# Patient Record
Sex: FEMALE | Race: WHITE | NOT HISPANIC OR LATINO | Employment: FULL TIME | URBAN - METROPOLITAN AREA
[De-identification: names, ages, dates, MRNs, and addresses within clinical notes are randomized per-mention and may not be internally consistent; named-entity substitution may affect disease eponyms.]

---

## 2017-06-09 ENCOUNTER — TELEPHONE (OUTPATIENT)
Dept: FAMILY MEDICINE | Facility: CLINIC | Age: 52
End: 2017-06-09

## 2017-06-09 NOTE — TELEPHONE ENCOUNTER
6/9/2017    Call Regarding Preventive Health Screening Colonoscopy and Mammogram    Attempt 1    Message on voicemail     Comments:       Outreach   cnt

## 2019-11-02 ENCOUNTER — HOSPITAL ENCOUNTER (EMERGENCY)
Facility: CLINIC | Age: 54
Discharge: HOME | End: 2019-11-02
Attending: EMERGENCY MEDICINE | Admitting: EMERGENCY MEDICINE
Payer: COMMERCIAL

## 2019-11-02 VITALS
SYSTOLIC BLOOD PRESSURE: 144 MMHG | HEIGHT: 68 IN | BODY MASS INDEX: 34.86 KG/M2 | OXYGEN SATURATION: 98 % | HEART RATE: 86 BPM | TEMPERATURE: 97.5 F | DIASTOLIC BLOOD PRESSURE: 84 MMHG | WEIGHT: 230 LBS

## 2019-11-02 DIAGNOSIS — M94.0 COSTOCHONDRITIS: ICD-10-CM

## 2019-11-02 PROCEDURE — 99283 EMERGENCY DEPT VISIT LOW MDM: CPT | Performed by: EMERGENCY MEDICINE

## 2019-11-02 PROCEDURE — 99282 EMERGENCY DEPT VISIT SF MDM: CPT | Mod: Z6 | Performed by: EMERGENCY MEDICINE

## 2019-11-02 ASSESSMENT — MIFFLIN-ST. JEOR: SCORE: 1686.77

## 2019-11-02 NOTE — ED AVS SNAPSHOT
[facility] Emergency Department  [address] [address]  Phone:  [phone]  Fax:  [phone]                                    Ifeoma Grajeda   MRN: [id number]    Department:  [facility] Emergency Department   Date of Visit:  11/2/2019           After Visit Summary Signature Page    I have received my discharge instructions, and my questions have been answered. I have discussed any challenges I see with this plan with the nurse or doctor.    ..........................................................................................................................................  Patient/Patient Representative Signature      ..........................................................................................................................................  Patient Representative Print Name and Relationship to Patient    ..................................................               ................................................  Date                                   Time    ..........................................................................................................................................  Reviewed by Signature/Title    ...................................................              ..............................................  Date                                               Time          22EPIC Rev 08/18

## 2019-11-02 NOTE — DISCHARGE INSTRUCTIONS
Tylenol/ibuprofen for discomfort, activity as tolerated    Return here for trouble breathing, faintness, fever, worsening pain or any other concern

## 2019-11-02 NOTE — ED PROVIDER NOTES
History     Chief Complaint   Patient presents with     Flank Pain     lt flank pain for past few days, no urinary issues, no fever     HPI  Ifeoma Grajeda is a 54 year old female who present to the ED for evaluation of left sided flank pain. She reports that the pain began a few days ago, is intermittent and feels sharp. She states that her pain is similar to pain she experienced when she had a kidney infection, seen earlier today sent here for possible imaging. She has tried icing, tylenol and ibuprofen, but found no relief. 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 She denies abdominal pain, urinary symptoms, fever and nausea.      Allergies:  No Known Allergies    Problem List:    Patient Active Problem List    Diagnosis Date Noted     Urge incontinence of urine 09/09/2014     Priority: Medium     Abnormal glucose 05/06/2014     Priority: Medium     Problem list name updated by automated process. Provider to review       Hypothyroidism 10/19/2012     Priority: Medium     Hyperlipidemia LDL goal <130 09/27/2012     Priority: Medium        Past Medical History:    Past Medical History:   Diagnosis Date     Hx of hysterectomy      Hypothyroid        Past Surgical History:    Past Surgical History:   Procedure Laterality Date     C REMOVAL GALLBLADDER       HYSTERECTOMY TOTAL ABDOMINAL         Family History:    Family History   Problem Relation Age of Onset     Osteoporosis Mother      Cancer Mother      Cancer Father         Lung     Alzheimer Disease Mother      Thyroid Disease Mother        Social History:  Marital Status:   [2]  Social History     Tobacco Use     Smoking status: Never Smoker     Smokeless tobacco: Never Used   Substance Use Topics     Alcohol use: Yes     Comment: ocassionaly     Drug use: No        Medications:    acetaminophen-codeine (TYLENOL W/CODEINE NO. 3) 300-30 MG per tablet  diclofenac (VOLTAREN) 50 MG EC  "tablet  ibuprofen (ADVIL,MOTRIN) 200 MG tablet  levothyroxine (SYNTHROID, LEVOTHROID) 88 MCG tablet  predniSONE (DELTASONE) 20 MG tablet          Review of Systems  All other systems reviewed and are negative.    Physical Exam   BP: (!) 144/84  Pulse: 86  Temp: 97.5  F (36.4  C)  Height: 172.7 cm (5' 8\")  Weight: 104.3 kg (230 lb)  SpO2: 98 %      Physical Exam  Nontoxic appearing no respiratory distress alert and oriented ×3  Head atraumatic normocephalic  Neck supple full active painless range of motion  Lungs clear to auscultation  Tender costochondral junction ribs 10 and 11 on the left side reproduces pain complaint  Heart regular no murmur  No CVA tenderness  Abdomen soft nontender bowel sounds positive no masses or HSM  Strength and sensation grossly intact throughout the extremities, gait and station normal  Speech is fluent, good eye contact, thought processes are rational  Lower extremities without swelling, redness or tenderness  Pedal pulses symmetrical and strong    ED Course        Procedures               Critical Care time:  none               No results found for this or any previous visit (from the past 24 hour(s)).    Medications - No data to display     9:10 patient assessed. Course of care outlined.     Assessments & Plan (with Medical Decision Making)  Costochondral chest wall pain left posterior lateral inferior.  Palpation reproduces pain complaint.  Usual differential considered, no indication for further evaluation.  Tylenol/ibuprofen.  Return criteria reviewed     I have reviewed the nursing notes.    I have reviewed the findings, diagnosis, plan and need for follow up with the patient.          Discharge Medication List as of 11/2/2019  9:56 AM          Final diagnoses:   Costochondritis     This document serves as a record of services personally performed by Darrell Polanco MD. It was created on their behalf by Mercy Witt, a trained medical scribe. The creation of this record is " based on the provider's personal observations and the statements of the patient. This document has been checked and approved by the attending provider.    11/2/2019   [facility] EMERGENCY DEPARTMENT     Darrell Polanco MD  11/02/19 1549

## 2020-12-22 ENCOUNTER — HOSPITAL ENCOUNTER (EMERGENCY)
Facility: CLINIC | Age: 55
Discharge: HOME | End: 2020-12-22
Attending: NURSE PRACTITIONER | Admitting: NURSE PRACTITIONER
Payer: OTHER MISCELLANEOUS

## 2020-12-22 VITALS
HEIGHT: 68 IN | SYSTOLIC BLOOD PRESSURE: 123 MMHG | TEMPERATURE: 98.8 F | BODY MASS INDEX: 34.25 KG/M2 | RESPIRATION RATE: 18 BRPM | DIASTOLIC BLOOD PRESSURE: 75 MMHG | OXYGEN SATURATION: 91 % | HEART RATE: 79 BPM | WEIGHT: 226 LBS

## 2020-12-22 DIAGNOSIS — Z20.822 SUSPECTED COVID-19 VIRUS INFECTION: ICD-10-CM

## 2020-12-22 DIAGNOSIS — R06.02 SHORTNESS OF BREATH: ICD-10-CM

## 2020-12-22 DIAGNOSIS — R11.2 NAUSEA WITH VOMITING: ICD-10-CM

## 2020-12-22 DIAGNOSIS — U07.1 2019 NOVEL CORONAVIRUS DISEASE (COVID-19): ICD-10-CM

## 2020-12-22 LAB
ANION GAP SERPL CALCULATED.3IONS-SCNC: 4 MMOL/L (ref 3–14)
BASOPHILS # BLD AUTO: 0 10E9/L (ref 0–0.2)
BASOPHILS NFR BLD AUTO: 0.3 %
BUN SERPL-MCNC: 5 MG/DL (ref 7–30)
CALCIUM SERPL-MCNC: 8 MG/DL (ref 8.5–10.1)
CHLORIDE SERPL-SCNC: 105 MMOL/L (ref 94–109)
CO2 SERPL-SCNC: 29 MMOL/L (ref 20–32)
CREAT SERPL-MCNC: 0.55 MG/DL (ref 0.52–1.04)
DIFFERENTIAL METHOD BLD: NORMAL
EOSINOPHIL # BLD AUTO: 0 10E9/L (ref 0–0.7)
EOSINOPHIL NFR BLD AUTO: 0.2 %
ERYTHROCYTE [DISTWIDTH] IN BLOOD BY AUTOMATED COUNT: 13.2 % (ref 10–15)
GFR SERPL CREATININE-BSD FRML MDRD: >90 ML/MIN/{1.73_M2}
GLUCOSE SERPL-MCNC: 113 MG/DL (ref 70–99)
HCT VFR BLD AUTO: 38.8 % (ref 35–47)
HGB BLD-MCNC: 12.8 G/DL (ref 11.7–15.7)
IMM GRANULOCYTES # BLD: 0.1 10E9/L (ref 0–0.4)
IMM GRANULOCYTES NFR BLD: 0.8 %
LYMPHOCYTES # BLD AUTO: 0.8 10E9/L (ref 0.8–5.3)
LYMPHOCYTES NFR BLD AUTO: 13.4 %
MCH RBC QN AUTO: 27.1 PG (ref 26.5–33)
MCHC RBC AUTO-ENTMCNC: 33 G/DL (ref 31.5–36.5)
MCV RBC AUTO: 82 FL (ref 78–100)
MONOCYTES # BLD AUTO: 0.5 10E9/L (ref 0–1.3)
MONOCYTES NFR BLD AUTO: 9 %
NEUTROPHILS # BLD AUTO: 4.6 10E9/L (ref 1.6–8.3)
NEUTROPHILS NFR BLD AUTO: 76.3 %
NRBC # BLD AUTO: 0 10*3/UL
NRBC BLD AUTO-RTO: 0 /100
PLATELET # BLD AUTO: 251 10E9/L (ref 150–450)
POTASSIUM SERPL-SCNC: 3.2 MMOL/L (ref 3.4–5.3)
RBC # BLD AUTO: 4.73 10E12/L (ref 3.8–5.2)
SODIUM SERPL-SCNC: 138 MMOL/L (ref 133–144)
WBC # BLD AUTO: 6 10E9/L (ref 4–11)

## 2020-12-22 PROCEDURE — 258N000003 HC RX IP 258 OP 636: Performed by: NURSE PRACTITIONER

## 2020-12-22 PROCEDURE — 99284 EMERGENCY DEPT VISIT MOD MDM: CPT | Mod: 25 | Performed by: NURSE PRACTITIONER

## 2020-12-22 PROCEDURE — 96374 THER/PROPH/DIAG INJ IV PUSH: CPT | Performed by: NURSE PRACTITIONER

## 2020-12-22 PROCEDURE — 80048 BASIC METABOLIC PNL TOTAL CA: CPT | Performed by: NURSE PRACTITIONER

## 2020-12-22 PROCEDURE — 250N000011 HC RX IP 250 OP 636: Performed by: NURSE PRACTITIONER

## 2020-12-22 PROCEDURE — 85025 COMPLETE CBC W/AUTO DIFF WBC: CPT | Performed by: NURSE PRACTITIONER

## 2020-12-22 PROCEDURE — 99284 EMERGENCY DEPT VISIT MOD MDM: CPT | Performed by: NURSE PRACTITIONER

## 2020-12-22 PROCEDURE — 96361 HYDRATE IV INFUSION ADD-ON: CPT | Performed by: NURSE PRACTITIONER

## 2020-12-22 RX ORDER — ONDANSETRON 4 MG/1
4 TABLET, ORALLY DISINTEGRATING ORAL EVERY 6 HOURS PRN
Qty: 10 TABLET | Refills: 0 | Status: SHIPPED | OUTPATIENT
Start: 2020-12-22 | End: 2024-03-28

## 2020-12-22 RX ORDER — ONDANSETRON 2 MG/ML
4 INJECTION INTRAMUSCULAR; INTRAVENOUS ONCE
Status: COMPLETED | OUTPATIENT
Start: 2020-12-22 | End: 2020-12-22

## 2020-12-22 RX ADMIN — SODIUM CHLORIDE 1000 ML: 9 INJECTION, SOLUTION INTRAVENOUS at 13:39

## 2020-12-22 RX ADMIN — ONDANSETRON 4 MG: 2 INJECTION INTRAMUSCULAR; INTRAVENOUS at 13:39

## 2020-12-22 ASSESSMENT — ENCOUNTER SYMPTOMS
DYSURIA: 0
TROUBLE SWALLOWING: 0
ARTHRALGIAS: 0
RHINORRHEA: 0
SINUS PAIN: 0
SHORTNESS OF BREATH: 1
SINUS PRESSURE: 0
EYE REDNESS: 0
COUGH: 0
LIGHT-HEADEDNESS: 0
WHEEZING: 0
SORE THROAT: 0
ABDOMINAL PAIN: 0
CHEST TIGHTNESS: 0
WEAKNESS: 0
FEVER: 0
NAUSEA: 1
DIARRHEA: 0
NUMBNESS: 0
JOINT SWELLING: 0
BLOOD IN STOOL: 0
STRIDOR: 0
MYALGIAS: 0
VOMITING: 1
EYE DISCHARGE: 0
HEADACHES: 0
DIZZINESS: 0
FATIGUE: 0
CHILLS: 0

## 2020-12-22 ASSESSMENT — MIFFLIN-ST. JEOR: SCORE: 1663.63

## 2020-12-22 NOTE — LETTER
December 22, 2020      To Whom It May Concern:      Ifeoma Grajeda was seen in our Emergency Department today, 12/22/20.  I expect her condition to improve over the next few days.  She may return to work when improved.    Sincerely,        МАРИЯ Head CNP

## 2020-12-22 NOTE — ED NOTES
"Pt reports 10 days post positive covid.  Pt reports continues to feel fatigue with no appetite.   Pt reports vomited after eating today due to things \" tasting\" bad.    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   Pt called work and reported that pt was in ED getting fluids   "

## 2020-12-22 NOTE — ED PROVIDER NOTES
History     Chief Complaint   Patient presents with     Vomiting     diagnosed covid positive 10 days ago; vomiting started am, concerned about dehydration, NO loss of taste of smell, reports unable to keep anything down, some shortness of breath     HPI  Ifeoma Grajeda is a 55 year old female who presents to the emergency department for evaluation of nausea and vomiting today. Patient diagnosed with COVID-19 10 days ago and was to return to work today but developed nausea and vomiting. 3 episodes of emesis, nonbloody nonbilious. She has accompanying lightheadedness and continued shortness of breath and fatigue. Reports lack of appetite and 'doesn't feel thirsty' so has had decreased intake. No fevers, headache, dizziness, chest pain, abdominal pain, diarrhea, and dysuria. No other sick individuals in the home. Nonsmoker without asthma or COPD.     Allergies:  No Known Allergies    Problem List:    Patient Active Problem List    Diagnosis Date Noted     Urge incontinence of urine 09/09/2014     Priority: Medium     Abnormal glucose 05/06/2014     Priority: Medium     Problem list name updated by automated process. Provider to review       Hypothyroidism 10/19/2012     Priority: Medium     Hyperlipidemia LDL goal <130 09/27/2012     Priority: Medium        Past Medical History:    Past Medical History:   Diagnosis Date     Hx of hysterectomy      Hypothyroid        Past Surgical History:    Past Surgical History:   Procedure Laterality Date     C REMOVAL GALLBLADDER       HYSTERECTOMY TOTAL ABDOMINAL       Family History:    Family History   Problem Relation Age of Onset     Osteoporosis Mother      Cancer Mother      Cancer Father         Lung     Alzheimer Disease Mother      Thyroid Disease Mother      Social History:  Marital Status:   [2]  Social History     Tobacco Use     Smoking status: Never Smoker     Smokeless tobacco: Never Used   Substance Use Topics     Alcohol use: Yes     Comment:  "ocassionaly     Drug use: No      Medications:         ondansetron (ZOFRAN ODT) 4 MG ODT tab      Review of Systems   Constitutional: Negative for chills, fatigue and fever.   HENT: Negative for congestion, ear discharge, ear pain, rhinorrhea, sinus pressure, sinus pain, sore throat and trouble swallowing.    Eyes: Negative for discharge and redness.   Respiratory: Positive for shortness of breath. Negative for cough, chest tightness, wheezing and stridor.    Cardiovascular: Negative for chest pain.   Gastrointestinal: Positive for nausea and vomiting. Negative for abdominal pain, blood in stool and diarrhea.   Genitourinary: Negative for dysuria.   Musculoskeletal: Negative for arthralgias, joint swelling and myalgias.   Skin: Negative for rash.   Neurological: Negative for dizziness, weakness, light-headedness, numbness and headaches.   All other systems reviewed and are negative.    Physical Exam   BP: 132/64  Pulse: 93  Temp: 98.8  F (37.1  C)  Resp: 18  Height: 172.7 cm (5' 8\")  Weight: 102.5 kg (226 lb)  SpO2: 94 %    Physical Exam  Constitutional:       General: She is not in acute distress.     Appearance: She is well-developed. She is not diaphoretic.   HENT:      Head: Normocephalic.   Eyes:      Conjunctiva/sclera: Conjunctivae normal.      Pupils: Pupils are equal, round, and reactive to light.   Neck:      Musculoskeletal: Normal range of motion and neck supple.   Cardiovascular:      Rate and Rhythm: Normal rate and regular rhythm.      Pulses: Normal pulses.   Pulmonary:      Effort: Pulmonary effort is normal. No tachypnea, accessory muscle usage or respiratory distress.      Breath sounds: Normal breath sounds and air entry. No decreased air movement. No decreased breath sounds, wheezing or rhonchi.   Abdominal:      General: There is no distension.      Palpations: Abdomen is soft.      Tenderness: There is no abdominal tenderness. There is no right CVA tenderness, left CVA tenderness, guarding or " rebound.   Musculoskeletal: Normal range of motion.   Skin:     General: Skin is warm.      Capillary Refill: Capillary refill takes less than 2 seconds.   Neurological:      General: No focal deficit present.      Mental Status: She is alert and oriented to person, place, and time.   Psychiatric:         Mood and Affect: Mood normal.       ED Course        Procedures    Results for orders placed or performed during the hospital encounter of 12/22/20 (from the past 24 hour(s))   CBC with platelets, differential   Result Value Ref Range    WBC 6.0 4.0 - 11.0 10e9/L    RBC Count 4.73 3.8 - 5.2 10e12/L    Hemoglobin 12.8 11.7 - 15.7 g/dL    Hematocrit 38.8 35.0 - 47.0 %    MCV 82 78 - 100 fl    MCH 27.1 26.5 - 33.0 pg    MCHC 33.0 31.5 - 36.5 g/dL    RDW 13.2 10.0 - 15.0 %    Platelet Count 251 150 - 450 10e9/L    Diff Method Automated Method     % Neutrophils 76.3 %    % Lymphocytes 13.4 %    % Monocytes 9.0 %    % Eosinophils 0.2 %    % Basophils 0.3 %    % Immature Granulocytes 0.8 %    Nucleated RBCs 0 0 /100    Absolute Neutrophil 4.6 1.6 - 8.3 10e9/L    Absolute Lymphocytes 0.8 0.8 - 5.3 10e9/L    Absolute Monocytes 0.5 0.0 - 1.3 10e9/L    Absolute Eosinophils 0.0 0.0 - 0.7 10e9/L    Absolute Basophils 0.0 0.0 - 0.2 10e9/L    Abs Immature Granulocytes 0.1 0 - 0.4 10e9/L    Absolute Nucleated RBC 0.0    Basic metabolic panel   Result Value Ref Range    Sodium 138 133 - 144 mmol/L    Potassium 3.2 (L) 3.4 - 5.3 mmol/L    Chloride 105 94 - 109 mmol/L    Carbon Dioxide 29 20 - 32 mmol/L    Anion Gap 4 3 - 14 mmol/L    Glucose 113 (H) 70 - 99 mg/dL    Urea Nitrogen 5 (L) 7 - 30 mg/dL    Creatinine 0.55 0.52 - 1.04 mg/dL    GFR Estimate >90 >60 mL/min/[1.73_m2]    GFR Estimate If Black >90 >60 mL/min/[1.73_m2]    Calcium 8.0 (L) 8.5 - 10.1 mg/dL     Medications   0.9% sodium chloride BOLUS (0 mLs Intravenous Stopped 12/22/20 8034)   ondansetron (ZOFRAN) injection 4 mg (4 mg Intravenous Given 12/22/20 0782)      Assessments & Plan (with Medical Decision Making)   Ifeoma Grajeda is a 55 year old female who presents to the emergency department for evaluation of nausea and vomiting today. Patient diagnosed with COVID-19 10 days ago and was to return to work today but developed nausea and vomiting. 3 episodes of emesis, nonbloody nonbilious. She has accompanying lightheadedness and continued shortness of breath and fatigue. Reports lack of appetite and 'doesn't feel thirsty' so has had decreased intake.     Patient is well appearing and in no acute distress. Blood pressure, pulse, respiratory rate and temp normal. Oxygen saturations ranging from 88-96%. Patient reports not feeling different based on oxygen saturations. CBC and BMP normal. Symptomatic resolution with fluids and Zofran. Will provide home rx for Zofran. Patient is a great candidate for the home oxygen saturation monitoring program and [facility]. She is agreeable to plan and home monitoring. Work note provided. Return to the ED with new or worsening symptoms. May use over the counter medications as needed and appropriate. Increase rest and hydration. Return precautions reviewed, all questions answered. Patient agreeable to plan of care and discharged in stable condition.    I have reviewed the nursing notes.    I have reviewed the findings, diagnosis, plan and need for follow up with the patient.  Discharge Medication List as of 12/22/2020  3:38 PM      START taking these medications    Details   ondansetron (ZOFRAN ODT) 4 MG ODT tab Take 1 tablet (4 mg) by mouth every 6 hours as needed for nausea, Disp-10 tablet, R-0, E-Prescribe           Final diagnoses:   Suspected COVID-19 virus infection   Shortness of breath   2019 novel coronavirus disease (COVID-19)   Nausea with vomiting     12/22/2020   [facility] EMERGENCY DEPT     Bianca Trivedi, APRN CNP  12/22/20 9760

## 2020-12-22 NOTE — ED TRIAGE NOTES
diagnosed covid positive 10 days ago; vomiting started am, concerned about dehydration, NO loss of taste of smell, reports unable to keep anything down, some shortness of breath

## 2020-12-22 NOTE — ED AVS SNAPSHOT
[facility] Emergency Dept  [address] [address]  Phone: [phone]  Fax: [phone]                                    Ifeoma Grajeda   MRN: [id number]    Department: [facility] Emergency Dept   Date of Visit: 12/22/2020           After Visit Summary Signature Page    I have received my discharge instructions, and my questions have been answered. I have discussed any challenges I see with this plan with the nurse or doctor.    ..........................................................................................................................................  Patient/Patient Representative Signature      ..........................................................................................................................................  Patient Representative Print Name and Relationship to Patient    ..................................................               ................................................  Date                                   Time    ..........................................................................................................................................  Reviewed by Signature/Title    ...................................................              ..............................................  Date                                               Time          22EPIC Rev 08/18

## 2020-12-23 ENCOUNTER — PATIENT OUTREACH (OUTPATIENT)
Dept: CARE COORDINATION | Facility: CLINIC | Age: 55
End: 2020-12-23

## 2020-12-23 NOTE — TELEPHONE ENCOUNTER
Care Coordination ED Discharge Follow up Note    ED Discharge date: 12-         Reason/Diagnosis for ED visit: nausea and vomiting due to COVID-19         Are you feeling better, the same, or worse since your ED visit: a little better         Were you sent home with oxygen and a pulse oximeter: Yes-oximeter     Are you currently utilizing the oxygen?  N/A    Are you currently utilizing the pulse oximeter: Yes    Do you understand how to utilize both: Yes-oximeter    What liter flow were you instructed to use? NA  What liter flow are you using to maintain your oxygen saturation: NA    What type of home oxygen system do you have (*ask about portability and ability to manage a portable oxygen delivery)?  NA    Who is your supply company? NA    What are your current oxygen saturations: 96% this morning     If red flag o2 sat, escalated to:    Activity:         How much activity can you do before you are SOB? Patient is not having SOB    Have you had to reduce your activities because of dyspnea or other symptoms? NA         Follow up:         Do you have any follow up appointments scheduled with your PCP or a specialist? No    Who are you seeing and when is it scheduled: NA    Do you feel like you have a plan in place in the event of an emergency? Yes      RN Notes:  Called patient. She reports nausea has improved but has had new diarrhea starting since Midnight with 4 stools since then. She notes that she tends to have diarrhea after eating. She plans to try immodium to see if this helps. Reviewed home treatment measures for diarrhea.  When asked, patient reports that she does not have a PCP and that she seldom goes to the doctor. She agrees with advice to schedule an appointment within 2-3 days after discharge and agrees once notified the appointment can be virtual. She plans on scheduling at [facility] and declines assistance with scheduling as she feels confident she can call on her own.  Patient has received [employer] invitation and plans on enrolling when she has time. Declines offer of care coordination services at this time. No further question or concerns per patient.

## 2021-01-03 ENCOUNTER — HOSPITAL ENCOUNTER (EMERGENCY)
Facility: CLINIC | Age: 56
Discharge: HOME | End: 2021-01-03
Attending: NURSE PRACTITIONER | Admitting: NURSE PRACTITIONER
Payer: COMMERCIAL

## 2021-01-03 VITALS
DIASTOLIC BLOOD PRESSURE: 85 MMHG | BODY MASS INDEX: 34.97 KG/M2 | HEART RATE: 88 BPM | OXYGEN SATURATION: 96 % | RESPIRATION RATE: 16 BRPM | SYSTOLIC BLOOD PRESSURE: 151 MMHG | WEIGHT: 230 LBS | TEMPERATURE: 98.1 F

## 2021-01-03 DIAGNOSIS — S61.412A LACERATION OF LEFT PALM: ICD-10-CM

## 2021-01-03 PROCEDURE — 12001 RPR S/N/AX/GEN/TRNK 2.5CM/<: CPT | Performed by: NURSE PRACTITIONER

## 2021-01-03 PROCEDURE — 99213 OFFICE O/P EST LOW 20 MIN: CPT | Mod: 25 | Performed by: NURSE PRACTITIONER

## 2021-01-03 PROCEDURE — G0463 HOSPITAL OUTPT CLINIC VISIT: HCPCS | Performed by: NURSE PRACTITIONER

## 2021-01-03 ASSESSMENT — ENCOUNTER SYMPTOMS
NEUROLOGICAL NEGATIVE: 1
RESPIRATORY NEGATIVE: 1
MUSCULOSKELETAL NEGATIVE: 1
CONSTITUTIONAL NEGATIVE: 1
CARDIOVASCULAR NEGATIVE: 1
WOUND: 1

## 2021-01-03 NOTE — ED PROVIDER NOTES
History     Chief Complaint   Patient presents with     Laceration     left hand laceration from glass occured today.      HPI  Ifeoma Grajeda is a 55 year old female who presents the urgent care for evaluation of left palm laceration sustained today when she cut it on a broken glass while doing dishes.  Bleeding controlled prior to arrival.  No numbness or tingling.  No decreased strength or mobility of the left hand.  Tetanus out of date, declined update.    Allergies:  No Known Allergies    Problem List:    Patient Active Problem List    Diagnosis Date Noted     Urge incontinence of urine 09/09/2014     Priority: Medium     Abnormal glucose 05/06/2014     Priority: Medium     Problem list name updated by automated process. Provider to review       Hypothyroidism 10/19/2012     Priority: Medium     Hyperlipidemia LDL goal <130 09/27/2012     Priority: Medium        Past Medical History:    Past Medical History:   Diagnosis Date     Hx of hysterectomy      Hypothyroid        Past Surgical History:    Past Surgical History:   Procedure Laterality Date     HC REMOVAL GALLBLADDER       HYSTERECTOMY TOTAL ABDOMINAL         Family History:    Family History   Problem Relation Age of Onset     Osteoporosis Mother      Cancer Mother      Alzheimer Disease Mother      Thyroid Disease Mother      Cancer Father         Lung       Social History:  Marital Status:   [2]  Social History     Tobacco Use     Smoking status: Never Smoker     Smokeless tobacco: Never Used   Substance Use Topics     Alcohol use: Yes     Comment: ocassionaly     Drug use: No        Medications:         ondansetron (ZOFRAN ODT) 4 MG ODT tab          Review of Systems   Constitutional: Negative.    Respiratory: Negative.    Cardiovascular: Negative.    Musculoskeletal: Negative.    Skin: Positive for wound.   Neurological: Negative.    All other systems reviewed and are negative.      Physical Exam   BP: (!) 151/85  Pulse: 88  Temp:  98.1  F (36.7  C)  Resp: 16  Weight: 104.3 kg (230 lb)  SpO2: 96 %      Physical Exam  Constitutional:       General: She is not in acute distress.     Appearance: Normal appearance.   Cardiovascular:      Rate and Rhythm: Normal rate and regular rhythm.   Pulmonary:      Effort: Pulmonary effort is normal.      Breath sounds: Normal breath sounds.   Musculoskeletal: Normal range of motion.   Skin:     General: Skin is warm.      Capillary Refill: Capillary refill takes less than 2 seconds.      Comments: 2 cm laceration to the left palm, no sign of secondary infection. Bleeding controlled. Perfusion equal bilaterally.    Neurological:      General: No focal deficit present.      Mental Status: She is alert.   Psychiatric:         Mood and Affect: Mood normal.         ED Course        [facility]     -Laceration Repair    Date/Time: 1/3/2021 12:34 PM  Performed by: Bianca Trivedi APRN CNP  Authorized by: Bianca Trivedi APRN CNP       ANESTHESIA (see MAR for exact dosages):     Anesthesia method:  Local infiltration    Local anesthetic:  Bupivacaine 0.5% w/o epi  LACERATION DETAILS     Location:  Hand    Hand location:  L palm    Length (cm):  2    REPAIR TYPE:     Repair type:  Simple      EXPLORATION:     Wound exploration: wound explored through full range of motion and entire depth of wound probed and visualized      Wound extent: no nerve damage and no tendon damage      Contaminated: no      TREATMENT:     Area cleansed with:  Betadine and Hibiclens    Amount of cleaning:  Standard    Irrigation solution:  Sterile water    SKIN REPAIR     Repair method:  Sutures    Suture size:  4-0    Suture material:  Nylon    Suture technique:  Simple interrupted    Number of sutures:  5    APPROXIMATION     Approximation:  Close    POST-PROCEDURE DETAILS     Dressing:  Antibiotic ointment and adhesive bandage      PROCEDURE   Patient Tolerance:  Patient tolerated the procedure well with no  immediate complications        No results found for this or any previous visit (from the past 24 hour(s)).    Medications - No data to display    Assessments & Plan (with Medical Decision Making)   Ifeoma Grajeda is a 55 year old female who presents the urgent care for evaluation of left palm laceration sustained today when she cut it on a broken glass while doing dishes. Exam as above. 5 sutures placed. Removal in 7 days. Educated on home wound care and reasons to seek reevaluation soon. Return precautions reviewed, all questions answered. Patient is agreeable to plan of care and discharged in no acute distress.      I have reviewed the nursing notes.    I have reviewed the findings, diagnosis, plan and need for follow up with the patient.    Discharge Medication List as of 1/3/2021 12:37 PM          Final diagnoses:   Laceration of left palm       1/3/2021   [facility] EMERGENCY DEPT     Bianca Trivedi, APRN CNP  01/03/21 1259

## 2021-01-03 NOTE — ED AVS SNAPSHOT
[facility]  [address] [address]  Phone: [phone]  Fax: [phone]                                    Ifeoma Grajeda   MRN: [id number]    Department: [facility]   Date of Visit: 1/3/2021           After Visit Summary Signature Page    I have received my discharge instructions, and my questions have been answered. I have discussed any challenges I see with this plan with the nurse or doctor.    ..........................................................................................................................................  Patient/Patient Representative Signature      ..........................................................................................................................................  Patient Representative Print Name and Relationship to Patient    ..................................................               ................................................  Date                                   Time    ..........................................................................................................................................  Reviewed by Signature/Title    ...................................................              ..............................................  Date                                               Time          22EPIC Rev 08/18

## 2021-03-17 ENCOUNTER — RECORDS - HEALTHEAST (OUTPATIENT)
Dept: LAB | Facility: CLINIC | Age: 56
End: 2021-03-17

## 2021-03-17 LAB
SARS-COV-2 PCR COMMENT: NORMAL
SARS-COV-2 RNA SPEC QL NAA+PROBE: NEGATIVE
SARS-COV-2 VIRUS SPECIMEN SOURCE: NORMAL

## 2021-03-23 ENCOUNTER — RECORDS - HEALTHEAST (OUTPATIENT)
Dept: LAB | Facility: CLINIC | Age: 56
End: 2021-03-23

## 2021-03-31 ENCOUNTER — RECORDS - HEALTHEAST (OUTPATIENT)
Dept: LAB | Facility: CLINIC | Age: 56
End: 2021-03-31

## 2021-04-07 ENCOUNTER — RECORDS - HEALTHEAST (OUTPATIENT)
Dept: LAB | Facility: CLINIC | Age: 56
End: 2021-04-07

## 2021-04-13 ENCOUNTER — RECORDS - HEALTHEAST (OUTPATIENT)
Dept: LAB | Facility: CLINIC | Age: 56
End: 2021-04-13

## 2021-04-20 ENCOUNTER — RECORDS - HEALTHEAST (OUTPATIENT)
Dept: LAB | Facility: CLINIC | Age: 56
End: 2021-04-20

## 2021-04-28 ENCOUNTER — RECORDS - HEALTHEAST (OUTPATIENT)
Dept: LAB | Facility: CLINIC | Age: 56
End: 2021-04-28

## 2021-05-05 ENCOUNTER — RECORDS - HEALTHEAST (OUTPATIENT)
Dept: LAB | Facility: CLINIC | Age: 56
End: 2021-05-05

## 2021-05-11 ENCOUNTER — RECORDS - HEALTHEAST (OUTPATIENT)
Dept: LAB | Facility: CLINIC | Age: 56
End: 2021-05-11

## 2021-09-09 ENCOUNTER — LAB REQUISITION (OUTPATIENT)
Dept: LAB | Facility: CLINIC | Age: 56
End: 2021-09-09

## 2021-09-09 DIAGNOSIS — U07.1 COVID-19: ICD-10-CM

## 2021-09-09 DIAGNOSIS — Z57.8 OCCUPATIONAL EXPOSURE TO OTHER RISK FACTORS: ICD-10-CM

## 2021-09-09 PROCEDURE — U0005 INFEC AGEN DETEC AMPLI PROBE: HCPCS | Performed by: FAMILY MEDICINE

## 2021-09-09 SDOH — HEALTH STABILITY - PHYSICAL HEALTH: OCCUPATIONAL EXPOSURE TO OTHER RISK FACTORS: Z57.8

## 2021-09-10 LAB — SARS-COV-2 RNA RESP QL NAA+PROBE: NEGATIVE

## 2021-10-08 ENCOUNTER — LAB REQUISITION (OUTPATIENT)
Dept: LAB | Facility: CLINIC | Age: 56
End: 2021-10-08

## 2021-10-08 DIAGNOSIS — U07.1 COVID-19: ICD-10-CM

## 2021-10-08 DIAGNOSIS — Z57.8 OCCUPATIONAL EXPOSURE TO OTHER RISK FACTORS: ICD-10-CM

## 2021-10-08 PROCEDURE — U0003 INFECTIOUS AGENT DETECTION BY NUCLEIC ACID (DNA OR RNA); SEVERE ACUTE RESPIRATORY SYNDROME CORONAVIRUS 2 (SARS-COV-2) (CORONAVIRUS DISEASE [COVID-19]), AMPLIFIED PROBE TECHNIQUE, MAKING USE OF HIGH THROUGHPUT TECHNOLOGIES AS DESCRIBED BY CMS-2020-01-R: HCPCS | Performed by: FAMILY MEDICINE

## 2021-10-08 SDOH — HEALTH STABILITY - PHYSICAL HEALTH: OCCUPATIONAL EXPOSURE TO OTHER RISK FACTORS: Z57.8

## 2021-10-10 LAB — SARS-COV-2 RNA RESP QL NAA+PROBE: NEGATIVE

## 2022-08-04 ENCOUNTER — LAB REQUISITION (OUTPATIENT)
Dept: LAB | Facility: CLINIC | Age: 57
End: 2022-08-04
Payer: COMMERCIAL

## 2022-08-04 DIAGNOSIS — U07.1 COVID-19: ICD-10-CM

## 2022-08-08 ENCOUNTER — LAB REQUISITION (OUTPATIENT)
Dept: LAB | Facility: CLINIC | Age: 57
End: 2022-08-08
Payer: COMMERCIAL

## 2022-08-08 DIAGNOSIS — Z57.8 OCCUPATIONAL EXPOSURE TO OTHER RISK FACTORS: ICD-10-CM

## 2022-08-08 DIAGNOSIS — U07.1 COVID-19: ICD-10-CM

## 2022-08-08 PROCEDURE — U0003 INFECTIOUS AGENT DETECTION BY NUCLEIC ACID (DNA OR RNA); SEVERE ACUTE RESPIRATORY SYNDROME CORONAVIRUS 2 (SARS-COV-2) (CORONAVIRUS DISEASE [COVID-19]), AMPLIFIED PROBE TECHNIQUE, MAKING USE OF HIGH THROUGHPUT TECHNOLOGIES AS DESCRIBED BY CMS-2020-01-R: HCPCS | Mod: ORL | Performed by: FAMILY MEDICINE

## 2022-08-08 SDOH — HEALTH STABILITY - PHYSICAL HEALTH: OCCUPATIONAL EXPOSURE TO OTHER RISK FACTORS: Z57.8

## 2022-08-09 LAB — SARS-COV-2 RNA RESP QL NAA+PROBE: POSITIVE

## 2023-02-17 ENCOUNTER — TELEPHONE (OUTPATIENT)
Dept: FAMILY MEDICINE | Facility: CLINIC | Age: 58
End: 2023-02-17
Payer: COMMERCIAL

## 2023-02-17 NOTE — TELEPHONE ENCOUNTER
Reason for Call:  Appointment Request    Patient requesting this type of appt:  Right foot pain     Requested provider: Salima Verdin    Reason patient unable to be scheduled: Not within requested timeframe    When does patient want to be seen/preferred time: 1-2 days    Comments: She is willing to see any female Doc. She would perfer to be seen on Tue the 21st. Or next week. She is  Having a lot of pain and hard time walking     Okay to leave a detailed message?: Yes at Cell number on file:    Telephone Information:   Mobile [phone]       Call taken on 2/17/2023 at 2:59 PM by Kira Pastor

## 2023-02-17 NOTE — TELEPHONE ENCOUNTER
"Pt was called.  States has bone spur in right foot-back of heel & medial ankle. Also has hx irritated achilles tendon that is \"acting up\" again. Throbbing on/off.  4 mos ago went in to clinic in [place] & was Rx prednisone & this helped.   Taking tylenol & this helps.  Pt told she would need a visit to discuss this with a provider. Pt has not seen LUCIO Verdin since 2015.  Pt states she will not go to , wants appt next week.  Told to call early Monday for any open appts. Pt will call next week.      Dianna Contreras RN        "

## 2023-03-02 ENCOUNTER — OFFICE VISIT (OUTPATIENT)
Dept: URGENT CARE | Facility: URGENT CARE | Age: 58
End: 2023-03-02
Payer: COMMERCIAL

## 2023-03-02 VITALS
SYSTOLIC BLOOD PRESSURE: 135 MMHG | HEART RATE: 71 BPM | TEMPERATURE: 98.6 F | BODY MASS INDEX: 33.91 KG/M2 | WEIGHT: 223 LBS | OXYGEN SATURATION: 98 % | DIASTOLIC BLOOD PRESSURE: 75 MMHG

## 2023-03-02 DIAGNOSIS — E03.9 HYPOTHYROIDISM, UNSPECIFIED TYPE: ICD-10-CM

## 2023-03-02 DIAGNOSIS — M79.671 RIGHT FOOT PAIN: Primary | ICD-10-CM

## 2023-03-02 LAB
T4 FREE SERPL-MCNC: 0.76 NG/DL (ref 0.9–1.7)
TSH SERPL DL<=0.005 MIU/L-ACNC: 6.22 UIU/ML (ref 0.3–4.2)

## 2023-03-02 PROCEDURE — 99203 OFFICE O/P NEW LOW 30 MIN: CPT

## 2023-03-02 PROCEDURE — 84439 ASSAY OF FREE THYROXINE: CPT

## 2023-03-02 PROCEDURE — 84443 ASSAY THYROID STIM HORMONE: CPT

## 2023-03-02 PROCEDURE — 36415 COLL VENOUS BLD VENIPUNCTURE: CPT

## 2023-03-02 RX ORDER — MELOXICAM 15 MG/1
15 TABLET ORAL DAILY
Qty: 14 TABLET | Refills: 0 | Status: SHIPPED | OUTPATIENT
Start: 2023-03-02 | End: 2024-03-28

## 2023-03-02 RX ORDER — PREDNISONE 20 MG/1
20 TABLET ORAL
COMMUNITY
Start: 2022-04-21 | End: 2024-03-28

## 2023-03-02 RX ORDER — LEVOTHYROXINE SODIUM 25 UG/1
25 TABLET ORAL DAILY
Qty: 30 TABLET | Refills: 0 | Status: SHIPPED | OUTPATIENT
Start: 2023-03-02 | End: 2024-03-28

## 2023-03-02 RX ORDER — TRAMADOL HYDROCHLORIDE 50 MG/1
50 TABLET ORAL
COMMUNITY
Start: 2021-06-24 | End: 2024-03-28

## 2023-03-02 RX ORDER — CYCLOBENZAPRINE HCL 10 MG
10 TABLET ORAL 3 TIMES DAILY PRN
Qty: 14 TABLET | Refills: 0 | Status: SHIPPED | OUTPATIENT
Start: 2023-03-02

## 2023-03-02 RX ORDER — LEVOTHYROXINE SODIUM 25 UG/1
25 TABLET ORAL
COMMUNITY
Start: 2021-05-07 | End: 2023-03-02

## 2023-03-02 NOTE — PATIENT INSTRUCTIONS
"Diagnosis: Orthopedic injury; Muscular strain/sprain injury, soft tissue injury    Plan:   Avoid or restrict any activities that may aggravate your symptoms. Cease exacerbating activity for 2 to 6 weeks, then gradually return to exercise/sport as tolerated.  Work/school/activity note ?   Recommending light stretching (when able to tolerate) to reduce your risks of developing a frozen joint or stiffness in joint   This will also help to increase strength and flexibility over time related to injury   Recovery expected within 2-6 weeks depending on severity, but some may take up to 6-12 months.  If symptoms fail to resolve or worsen recommending follow-up with orthopedics/physical therapy after allowing adequate time for healing. - will place referral today   P.R.I.C.E.  For musculoskeletal injuries including: sprains, strains, bruises - use the acronym P.R.I.C.E. for symptomatic treatment:   Prevent & Protect further injury.  Rest affected area   Ice applied (or heat, or can alternate)   Compression of injured area (ACE bandage/splint).  Elevation of injured area.  Pain  Ibuprofen / tylenol for pain   - Ibuprofen 600mg Q6hr as needed  (can use celebrex if GI upset or GI bleed risk)    - tylenol 500mg Q8hr   - Can use aleve / naproxen / naprosyn also     No narcotics - no evidence to support this use and people tend to over use \"injured\" extremity when not having pain    (Pain is body's way of making you take it easy for awhile)   - orthopedic speciality will discuss stronger medications if needed indicated at that time  Monitor for:   Worsening pain   Worsening numbness and tingling   Loss of range of motion or strength   Redness, warmth or infection at site   Fevers, chills      "

## 2023-03-02 NOTE — PROGRESS NOTES
URGENT CARE  Assessment & Plan   Assessment:   Ifeoma Grajeda is a 58 year old female who's clinical presentation today is consistent with:   1. Right foot pain  - cyclobenzaprine (FLEXERIL) 10 MG tablet;  - meloxicam (MOBIC) 15 MG tablet;   - Orthopedic  Referral; Future    2. Hypothyroidism, unspecified type  - TSH with free T4 reflex; Future  - levothyroxine (SYNTHROID/LEVOTHROID) 25 MCG tablet;   - TSH with free T4 reflex    No alarm signs or symptoms present   Differential Diagnoses for this patient's CC include    fracture, dislocation  Ligamentous vs tendon pathology,   musculoskeletal injury, soft tissue injury    Plan:  (1) will treat patient at this time symptomatically and supportively, this will include encouraging: using NSAIDs/Tylenol to help decrease pain and inflammation, resting, applying ice/heat as needed, compression and elevation  Educated patient to follow-up with their PCP or ortho in the next 1-2 weeks for further evaluation and reassessment, and due to the possibility of an occult fracture} also discussed to return immediatly  if symptoms worsen after today's visit.   (2) patient also requesting refill of her Synthroid for her hypothyroidism, discussed with patient that we can do a one-time refill of this medication along with a current TSH reflex to T4 lab work, but that patient will need to schedule a follow-up visit with her PCP for continued evaluation and treatment of this condition.    Patient  is  agreeable to treatment plan and state they will follow-up if symptoms do not improve and/or if symptoms worsen (see patient's AVS 'monitor for' section for specific patient instructions given and discussed regarding what to watch for and when to follow up)    Medications ordered are listed above, please see AVS for patient's specific and personalized discharge instructions given     МАРИЯ Hurtado [facility]  "BRANCH      ______________________________________________________________________        Subjective  Subjective     HPI: Ifeoma Grajeda  is a 58 year old  female who presents today for evaluation the following concerns:   Patient presents endorsing right heel pain which started \"months maybe years ago\"    Patient endorses she had an xray in the past and it showed a \"bone spur\" patient states it waxes and wanes with pain. Patient presents today b/c it seems to be bothering her more recently.   Patient states this pain is not  related to a traumatic injury/accident and is a chronic condition.     Patient localizes the pain to the posterior aspect of her right heel, and states there is no  radiation of the pain to the calf/shin or foot   patient denies any associated} symptoms such as swelling, redness or bruising   Patient states their: Skin is intact}. ROM is \"normal\", has full range}, and their strength is normal}   Patient reports sensation is without numbness or tingling.   Self care to this point includes: wearing open backed shoes .   Patient has no history of injury, fracture or surgery to this area of concern in the past.  Patient states she has not seen ortho in the past for this condition and has never had a CT scan or MRI       No Known Allergies  Patient Active Problem List   Diagnosis     Hyperlipidemia LDL goal <130     Hypothyroidism     Abnormal glucose     Urge incontinence of urine       Review of Systems:  Pertinent review of systems as reflected in HPI, otherwise negative.     Objective  Objective    Physical Exam:  Vitals:    03/02/23 1334   BP: 135/75   Pulse: 71   Temp: 98.6  F (37  C)   TempSrc: Tympanic   SpO2: 98%   Weight: 101.2 kg (223 lb)      General:   alert and oriented, no acute distress, non- ill-appearing   Vital signs reviewed: afebrile and normotensive     Psy/mental status: cooperative f  SKIN: intact   Msk:   Right foot, heel    no  erythremia, ecchymosis, bruising, or  " inflammation present on the  plantar, dorsal,medial, lateral} aspect of the forefoot/rearfoot or heel    Increased tenderness/pain with palpation on the heel   No} decreased range of motion with dorsiflexion, plantar flexion, inversion, and  eversion.   Temperature equal} to body temperature. Neurovascularly intact distally with pulse +2. no crepitus, no gross deformity, and no laceration.    I explained my diagnostic considerations and recommendations to the patient, who voiced understanding and agreement with the treatment plan.   All questions were answered.   We discussed potential side effects, risks and benefits of any prescribed or recommended therapies, as well as expectations for response to treatments.  Please see AVS for any patient instructions & handouts given.   Patient was advised to contact the Nurse Care Line, their Primary Care provider, Urgent Care, or the Emergency Department if there are new or worsening symptoms, or call 911 for emergencies.        ______________________________________________________________________        Patient Instructions   Diagnosis: Orthopedic injury; Muscular strain/sprain injury, soft tissue injury    Plan:   1. Avoid or restrict any activities that may aggravate your symptoms. Cease exacerbating activity for 2 to 6 weeks, then gradually return to exercise/sport as tolerated.    Work/school/activity note ?     Recommending light stretching (when able to tolerate) to reduce your risks of developing a frozen joint or stiffness in joint     This will also help to increase strength and flexibility over time related to injury     Recovery expected within 2-6 weeks depending on severity, but some may take up to 6-12 months.    If symptoms fail to resolve or worsen recommending follow-up with orthopedics/physical therapy after allowing adequate time for healing. - will place referral today   P.R.I.C.E.  For musculoskeletal injuries including: sprains, strains, bruises - use  "the acronym P.R.I.C.E. for symptomatic treatment:   1. Prevent & Protect further injury.  2. Rest affected area   3. Ice applied (or heat, or can alternate)   4. Compression of injured area (ACE bandage/splint).  5. Elevation of injured area.  Pain  Ibuprofen / tylenol for pain   - Ibuprofen 600mg Q6hr as needed  (can use celebrex if GI upset or GI bleed risk)    - tylenol 500mg Q8hr   - Can use aleve / naproxen / naprosyn also     No narcotics - no evidence to support this use and people tend to over use \"injured\" extremity when not having pain    (Pain is body's way of making you take it easy for awhile)   - orthopedic speciality will discuss stronger medications if needed indicated at that time  Monitor for:     Worsening pain     Worsening numbness and tingling     Loss of range of motion or strength     Redness, warmth or infection at site     Fevers, chills           "

## 2023-03-03 NOTE — RESULT ENCOUNTER NOTE
"Patient was given refill  of Synthroid with recommendations to \"followup with PCP for continued evaluation and treatment of this condition\""

## 2023-03-27 ENCOUNTER — ANCILLARY PROCEDURE (OUTPATIENT)
Dept: GENERAL RADIOLOGY | Facility: CLINIC | Age: 58
End: 2023-03-27
Attending: PODIATRIST
Payer: COMMERCIAL

## 2023-03-27 ENCOUNTER — OFFICE VISIT (OUTPATIENT)
Dept: PODIATRY | Facility: CLINIC | Age: 58
End: 2023-03-27
Payer: COMMERCIAL

## 2023-03-27 VITALS
HEIGHT: 68 IN | DIASTOLIC BLOOD PRESSURE: 82 MMHG | SYSTOLIC BLOOD PRESSURE: 137 MMHG | BODY MASS INDEX: 33.8 KG/M2 | HEART RATE: 83 BPM | WEIGHT: 223 LBS

## 2023-03-27 DIAGNOSIS — M77.31 CALCANEAL SPUR OF RIGHT FOOT: ICD-10-CM

## 2023-03-27 DIAGNOSIS — M76.60 INSERTIONAL ACHILLES TENDINOPATHY: ICD-10-CM

## 2023-03-27 DIAGNOSIS — M77.31 CALCANEAL SPUR OF RIGHT FOOT: Primary | ICD-10-CM

## 2023-03-27 PROCEDURE — 99203 OFFICE O/P NEW LOW 30 MIN: CPT | Performed by: PODIATRIST

## 2023-03-27 PROCEDURE — 73650 X-RAY EXAM OF HEEL: CPT | Mod: TC | Performed by: RADIOLOGY

## 2023-03-27 ASSESSMENT — PATIENT HEALTH QUESTIONNAIRE - PHQ9: SUM OF ALL RESPONSES TO PHQ QUESTIONS 1-9: 10

## 2023-03-27 ASSESSMENT — PAIN SCALES - GENERAL: PAINLEVEL: MILD PAIN (2)

## 2023-03-27 NOTE — PATIENT INSTRUCTIONS

## 2023-03-27 NOTE — NURSING NOTE
Depression Response    Patient completed the PHQ-9 assessment for depression and scored >9? Yes  Question 9 on the PHQ-9 was positive for suicidality? No  Does patient have current mental health provider? No    Is this a virtual visit? No    I personally notified the following: visit provider    Elise Batista MA on 3/27/2023 at 9:04 AM

## 2023-03-27 NOTE — NURSING NOTE
"Chief Complaint   Patient presents with     Consult     Right foot pain       Initial /82   Pulse 83   Ht 1.727 m (5' 8\")   Wt 101.2 kg (223 lb)   BMI 33.91 kg/m   Estimated body mass index is 33.91 kg/m  as calculated from the following:    Height as of this encounter: 1.727 m (5' 8\").    Weight as of this encounter: 101.2 kg (223 lb).  Medications and allergies reviewed.      Elise GUTIERREZ MA    "

## 2023-03-27 NOTE — PROGRESS NOTES
PATIENT HISTORY:  Ifeoma Grajeda is a 58 year old female who presents to clinic in consultation at the request of  Crys Wilson C.N.P. with a chief complaint of right foot pain.  The patient is seen by themselves.  The patient relates the pain is primarily located around the bone spur in the back of the heel.  Reports insidious onset without acute precipitating event. that has been going on for 1 year(s).  The patient has previously tried wear croc's with little relief.  Prior history of similar pain/issues ~ 6 (left foot) years ago..  The patient is currently employed as nurse.  Any previous notes and studies that pertain to the patient's condition were reviewed.    Pertinent medical, surgical and family history was reviewed in the Epic chart.    Past Medical History:   Past Medical History:   Diagnosis Date     Hx of hysterectomy      Hypothyroid        Medications:   Current Outpatient Medications:      cyclobenzaprine (FLEXERIL) 10 MG tablet, Take 1 tablet (10 mg) by mouth 3 times daily as needed for muscle spasms (Patient not taking: Reported on 3/27/2023), Disp: 14 tablet, Rfl: 0     levothyroxine (SYNTHROID/LEVOTHROID) 25 MCG tablet, Take 1 tablet (25 mcg) by mouth daily (Patient not taking: Reported on 3/27/2023), Disp: 30 tablet, Rfl: 0     meloxicam (MOBIC) 15 MG tablet, Take 1 tablet (15 mg) by mouth daily (Patient not taking: Reported on 3/27/2023), Disp: 14 tablet, Rfl: 0     ondansetron (ZOFRAN ODT) 4 MG ODT tab, Take 1 tablet (4 mg) by mouth every 6 hours as needed for nausea (Patient not taking: Reported on 3/2/2023), Disp: 10 tablet, Rfl: 0     predniSONE (DELTASONE) 20 MG tablet, Take 20 mg by mouth (Patient not taking: Reported on 3/2/2023), Disp: , Rfl:      traMADol (ULTRAM) 50 MG tablet, Take 50 mg by mouth (Patient not taking: Reported on 3/2/2023), Disp: , Rfl:      trimethoprim-polymyxin b (POLYTRIM) 64406-7.1 UNIT/ML-% ophthalmic solution, Place 1-2 drops into the right eye every 4  "hours, Disp: 10 mL, Rfl: 0     Allergies:    Allergies   Allergen Reactions     Ciprofloxacin Itching       Vitals: /82   Pulse 83   Ht 1.727 m (5' 8\")   Wt 101.2 kg (223 lb)   BMI 33.91 kg/m    BMI= Body mass index is 33.91 kg/m .    LOWER EXTREMITY PHYSICAL EXAM    Dermatologic: Skin is intact to right lower extremity without significant lesions, rash or abrasion.        Vascular: DP & PT pulses are intact & regular on the right.   CFT and skin temperature is normal to the right lower extremity.     Neurologic: Lower extremity sensation is intact to light touch.  No evidence of weakness in the right lower extremity.        Musculoskeletal: Patient is ambulatory without assistive device or brace.  No gross ankle deformity noted.  No foot or ankle joint effusion is noted.  Noted pain on palpation over the posterior aspect of the right heel near the insertion point of the Achilles tendon.  No surrounding erythema noted.  Noted tight gastroc complex on the right lower extremity.    Diagnostics:  Radiographs included calcaneal lateral and axial views of the right foot demonstrating posterior calcaneal spurring with no cortical erosions or periosteal elevation.  All joint margins appear stable.  There is no apparent fracture or tumor formation noted.  There is no evidence of foreign body.  The images were independently reviewed by myself along with the patient explaining the findings.      ASSESSMENT / PLAN:     ICD-10-CM    1. Calcaneal spur of right foot  M77.31 XR Calcaneus Right G/E 2 Views     CANCELED: XR Calcaneus Right G/E 2 Views      2. Insertional Achilles tendinopathy  M76.60 Physical Therapy Referral          I have explained to Ifeoma about the conditions.  We discussed the underlying contributing factors to the condition as well as treatment options along with expected length of recovery.  At this time, the patient was educated on the importance of offloading supportive shoes and other devices. "  I demonstrated to the patient calf stretches to perform every hour daily until symptoms resolve.  After symptoms resolve, the patient was advised to perform the stretches 3 times daily to prevent future recurrence.  The patient was instructed to perform warm soaks with Epson salt after which to also apply over-the-counter Voltaren gel to deeply massage the injured tissue.  The patient was instructed to do this on a daily basis until symptoms resolve.   The patient was referred to physical therapy for [place] deep tissue fascial release of the gastroc aponeurosis and Achilles tendon on the right.  The patient may return in four weeks for reevaluation to determine if any further treatment will be needed.      Ifeoma verbalized agreement with and understanding of the rational for the diagnosis and treatment plan.  All questions were answered to best of my ability and the patient's satisfaction. The patient was advised to contact the clinic with any questions that may arise after the clinic visit.      Disclaimer: This note consists of symbols derived from keyboarding, dictation and/or voice recognition software. As a result, there may be errors in the script that have gone undetected. Please consider this when interpreting information found in this chart.       OLGA Vazquez D.P.M., F.JUANJOSE.C.F.A.S.

## 2023-03-27 NOTE — LETTER
3/27/2023         RE: Ifeoma Grajeda  [address] [place] [address]        Dear Colleague,    Thank you for referring your patient, Ifeoma Grajeda, to the [facility] [place]. Please see a copy of my visit note below.    PATIENT HISTORY:  Ifeoma Grajeda is a 58 year old female who presents to clinic in consultation at the request of  Crys Wilson C.N.P. with a chief complaint of right foot pain.  The patient is seen by themselves.  The patient relates the pain is primarily located around the bone spur in the back of the heel.  Reports insidious onset without acute precipitating event. that has been going on for 1 year(s).  The patient has previously tried wear croc's with little relief.  Prior history of similar pain/issues ~ 6 (left foot) years ago..  The patient is currently employed as nurse.  Any previous notes and studies that pertain to the patient's condition were reviewed.    Pertinent medical, surgical and family history was reviewed in the Epic chart.    Past Medical History:   Past Medical History:   Diagnosis Date     Hx of hysterectomy      Hypothyroid        Medications:   Current Outpatient Medications:      cyclobenzaprine (FLEXERIL) 10 MG tablet, Take 1 tablet (10 mg) by mouth 3 times daily as needed for muscle spasms (Patient not taking: Reported on 3/27/2023), Disp: 14 tablet, Rfl: 0     levothyroxine (SYNTHROID/LEVOTHROID) 25 MCG tablet, Take 1 tablet (25 mcg) by mouth daily (Patient not taking: Reported on 3/27/2023), Disp: 30 tablet, Rfl: 0     meloxicam (MOBIC) 15 MG tablet, Take 1 tablet (15 mg) by mouth daily (Patient not taking: Reported on 3/27/2023), Disp: 14 tablet, Rfl: 0     ondansetron (ZOFRAN ODT) 4 MG ODT tab, Take 1 tablet (4 mg) by mouth every 6 hours as needed for nausea (Patient not taking: Reported on 3/2/2023), Disp: 10 tablet, Rfl: 0     predniSONE (DELTASONE) 20 MG tablet, Take 20 mg by mouth (Patient not taking:  "Reported on 3/2/2023), Disp: , Rfl:      traMADol (ULTRAM) 50 MG tablet, Take 50 mg by mouth (Patient not taking: Reported on 3/2/2023), Disp: , Rfl:      trimethoprim-polymyxin b (POLYTRIM) 54874-7.1 UNIT/ML-% ophthalmic solution, Place 1-2 drops into the right eye every 4 hours, Disp: 10 mL, Rfl: 0     Allergies:    Allergies   Allergen Reactions     Ciprofloxacin Itching       Vitals: /82   Pulse 83   Ht 1.727 m (5' 8\")   Wt 101.2 kg (223 lb)   BMI 33.91 kg/m    BMI= Body mass index is 33.91 kg/m .    LOWER EXTREMITY PHYSICAL EXAM    Dermatologic: Skin is intact to right lower extremity without significant lesions, rash or abrasion.        Vascular: DP & PT pulses are intact & regular on the right.   CFT and skin temperature is normal to the right lower extremity.     Neurologic: Lower extremity sensation is intact to light touch.  No evidence of weakness in the right lower extremity.        Musculoskeletal: Patient is ambulatory without assistive device or brace.  No gross ankle deformity noted.  No foot or ankle joint effusion is noted.  Noted pain on palpation over the posterior aspect of the right heel near the insertion point of the Achilles tendon.  No surrounding erythema noted.  Noted tight gastroc complex on the right lower extremity.    Diagnostics:  Radiographs included calcaneal lateral and axial views of the right foot demonstrating posterior calcaneal spurring with no cortical erosions or periosteal elevation.  All joint margins appear stable.  There is no apparent fracture or tumor formation noted.  There is no evidence of foreign body.  The images were independently reviewed by myself along with the patient explaining the findings.      ASSESSMENT / PLAN:     ICD-10-CM    1. Calcaneal spur of right foot  M77.31 XR Calcaneus Right G/E 2 Views     CANCELED: XR Calcaneus Right G/E 2 Views      2. Insertional Achilles tendinopathy  M76.60 Physical Therapy Referral          I have explained " to Ifeoma about the conditions.  We discussed the underlying contributing factors to the condition as well as treatment options along with expected length of recovery.  At this time, the patient was educated on the importance of offloading supportive shoes and other devices.  I demonstrated to the patient calf stretches to perform every hour daily until symptoms resolve.  After symptoms resolve, the patient was advised to perform the stretches 3 times daily to prevent future recurrence.  The patient was instructed to perform warm soaks with Epson salt after which to also apply over-the-counter Voltaren gel to deeply massage the injured tissue.  The patient was instructed to do this on a daily basis until symptoms resolve.   The patient was referred to physical therapy for [place] deep tissue fascial release of the gastroc aponeurosis and Achilles tendon on the right.  The patient may return in four weeks for reevaluation to determine if any further treatment will be needed.      Ifeoma verbalized agreement with and understanding of the rational for the diagnosis and treatment plan.  All questions were answered to best of my ability and the patient's satisfaction. The patient was advised to contact the clinic with any questions that may arise after the clinic visit.      Disclaimer: This note consists of symbols derived from keyboarding, dictation and/or voice recognition software. As a result, there may be errors in the script that have gone undetected. Please consider this when interpreting information found in this chart.       BRENDA Kamara.P.LUCIO., F.A.C.F.A.S.      Depression Screening Follow-up    PHQ 3/27/2023   PHQ-9 Total Score 10   Q9: Thoughts of better off dead/self-harm past 2 weeks Not at all       Does the patient currently have a mental health provider?  No  Follow Up Actions Taken  Patient to follow up with PCP. Clinic staff to schedule appointment if able.     Leighton Vazquez,  KATI      Again, thank you for allowing me to participate in the care of your patient.        Sincerely,        Leighton Vazquez DPM

## 2023-03-27 NOTE — PROGRESS NOTES
Depression Screening Follow-up    PHQ 3/27/2023   PHQ-9 Total Score 10   Q9: Thoughts of better off dead/self-harm past 2 weeks Not at all       Does the patient currently have a mental health provider?  No  Follow Up Actions Taken  Patient to follow up with PCP. Clinic staff to schedule appointment if able.     Leighton Vazquez DPM

## 2023-03-30 ENCOUNTER — OFFICE VISIT (OUTPATIENT)
Dept: FAMILY MEDICINE | Facility: CLINIC | Age: 58
End: 2023-03-30
Payer: COMMERCIAL

## 2023-03-30 VITALS
WEIGHT: 236.4 LBS | SYSTOLIC BLOOD PRESSURE: 122 MMHG | DIASTOLIC BLOOD PRESSURE: 84 MMHG | HEART RATE: 82 BPM | HEIGHT: 68 IN | RESPIRATION RATE: 18 BRPM | BODY MASS INDEX: 35.83 KG/M2 | TEMPERATURE: 97.1 F | OXYGEN SATURATION: 98 %

## 2023-03-30 DIAGNOSIS — E66.01 MORBID OBESITY (H): ICD-10-CM

## 2023-03-30 DIAGNOSIS — H11.151 PINGUECULA, RIGHT EYE: Primary | ICD-10-CM

## 2023-03-30 PROCEDURE — 99213 OFFICE O/P EST LOW 20 MIN: CPT | Performed by: NURSE PRACTITIONER

## 2023-03-30 RX ORDER — POLYMYXIN B SULFATE AND TRIMETHOPRIM 1; 10000 MG/ML; [USP'U]/ML
1-2 SOLUTION OPHTHALMIC EVERY 4 HOURS
Qty: 10 ML | Refills: 0 | Status: SHIPPED | OUTPATIENT
Start: 2023-03-30 | End: 2024-03-28

## 2023-03-30 ASSESSMENT — PAIN SCALES - GENERAL: PAINLEVEL: MODERATE PAIN (5)

## 2023-03-30 NOTE — PROGRESS NOTES
"  Assessment & Plan     Pinguecula, right eye  Evaluation consistent with benign lesion of eye ball. However, with the acute inflammation will treat with polytrim and advised follow up if no improvement. Encouraged follow up with eye doctor if any red flag symptoms present including eye pain, vision change, or progression of the lesion.   - trimethoprim-polymyxin b (POLYTRIM) 89863-4.1 UNIT/ML-% ophthalmic solution; Place 1-2 drops into the right eye every 4 hours               BMI:   Estimated body mass index is 35.94 kg/m  as calculated from the following:    Height as of this encounter: 1.727 m (5' 8\").    Weight as of this encounter: 107.2 kg (236 lb 6.4 oz).           МАРИЯ Jett CNP  [facility]   Ifeoma is a 58 year old, presenting for the following health issues:  Eye Problem and Health Maintenance (Advised patient of hm.  Declines shots.)  No flowsheet data found.  History of Present Illness       Reason for visit:  Sti    She eats 0-1 servings of fruits and vegetables daily.She consumes 2 sweetened beverage(s) daily.She exercises with enough effort to increase her heart rate 30 to 60 minutes per day.  She exercises with enough effort to increase her heart rate 3 or less days per week. She is missing 7 dose(s) of medications per week.       Eye(s) Problem  Onset/Duration: x 2 weeks  Description: doesn't itch  Location: YES- right eye  Pain: YES comes and goes  Redness: YES  Accompanying Signs & Symptoms:  Discharge/mattering: No  Swelling: No  Visual changes: YES- someitmes in the morning it's a little fuzzy  Fever: No  Nasal Congestion: No  Bothered by bright lights: No  History:  Trauma: No  Foreign body exposure: No  Wearing contacts: No  Precipitating or alleviating factors: None  Therapies tried and outcome: eye drops helped for a while, not anymore      Bump on the eye ball, feels like something is there, then its not. No drainage. No changes to eye " "lids.       Review of Systems   Constitutional, HEENT, cardiovascular, pulmonary, gi and gu systems are negative, except as otherwise noted.      Objective    /84 (BP Location: Right arm, Patient Position: Sitting, Cuff Size: Adult Regular)   Pulse 82   Temp 97.1  F (36.2  C) (Tympanic)   Resp 18   Ht 1.727 m (5' 8\")   Wt 107.2 kg (236 lb 6.4 oz)   SpO2 98%   BMI 35.94 kg/m    Body mass index is 35.94 kg/m .  Physical Exam   GENERAL: healthy, alert and no distress  EYES: PERRL, EOMI, visual fields normal and sclera on right inner aspect with small white/ yellow bump with surrounding erythremia localized to this area. Remaining of eye exam are normal bilaterally.    PSYCH: mentation appears normal, affect normal/bright                    "

## 2023-04-09 ENCOUNTER — HEALTH MAINTENANCE LETTER (OUTPATIENT)
Age: 58
End: 2023-04-09

## 2023-04-12 ENCOUNTER — HOSPITAL ENCOUNTER (OUTPATIENT)
Dept: PHYSICAL THERAPY | Facility: CLINIC | Age: 58
Setting detail: THERAPIES SERIES
Discharge: HOME | End: 2023-04-12
Attending: PODIATRIST
Payer: COMMERCIAL

## 2023-04-12 DIAGNOSIS — M76.60 INSERTIONAL ACHILLES TENDINOPATHY: ICD-10-CM

## 2023-04-12 PROCEDURE — 97140 MANUAL THERAPY 1/> REGIONS: CPT | Mod: GP | Performed by: PHYSICAL THERAPIST

## 2023-04-12 PROCEDURE — 97110 THERAPEUTIC EXERCISES: CPT | Mod: GP | Performed by: PHYSICAL THERAPIST

## 2023-04-12 PROCEDURE — 97161 PT EVAL LOW COMPLEX 20 MIN: CPT | Mod: GP | Performed by: PHYSICAL THERAPIST

## 2023-04-12 NOTE — PROGRESS NOTES
04/12/23 0700   General Information   Type of Visit Initial OP Ortho PT Evaluation   Start of Care Date 04/12/23   Referring Physician Leighton Vazquez DPM   Patient/Family Goals Statement to be painfree, be able to wear a tennis shoe   Orders Evaluate and Treat   Orders Comment Araseli deep tissue massage achilles tendon/gastroc aponeurosis, right   Date of Order 03/27/23   Medical Diagnosis achilles tendinopathy   Body Part(s)   Body Part(s) Ankle/Foot   Presentation and Etiology   Pertinent history of current problem (include personal factors and/or comorbidities that impact the POC) Pt presents w/ R insertional achilles tendinopathy X 1 year.   Pt states she initially had L sided symptoms--that has improved and now it is R sided symptoms.  Pt notes intermittent pain 6-8/10.   No injections.  Meds: tylenol prn.  xray in chart:  IMPRESSION: Mild increase in the size of the Achilles enthesophyte.  There is also some calcification in the distal Achilles tendon,  unchanged. The plantar calcaneal enthesophyte is smaller. Soft tissue  fullness in the region of the Achilles tendon may be related to  tendinosis. MRI could better evaluate the soft tissues if clinically  warranted. No other bone/joint findings.  PMHX:   unremarkable.   Mod complexity: job tasks   Impairments A. Pain;H. Impaired gait;O. Blurred vision   Pain quality A. Sharp;C. Aching;F. Stabbing   Pain exacerbation comment unable to wear tennis shoes.  Laying on her back has to prop foot w/ pillow,  R SL bothers R hip.  Prolonged standing/ walking --at work can be on her feet X 6 hours.   Pain/symptoms eased by E. Changing positions;H. Cold   Progression of symptoms since onset: Worsened  (more frequent pain)   Current Level of Function   Patient role/employment history A. 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 Yes   Have you fallen and had an injury in the past year? No   Timed Up and Go score (seconds) 9.5   Is patient a fall risk? No   Abuse Screen (yes response referral indicated)   Feels Unsafe at Home or Work/School no   Feels Threatened by Someone no   Does Anyone Try to Keep You From Having Contact with Others or Doing Things Outside Your Home? no   Physical Signs of Abuse Present no   Ankle/Foot Objective Findings   Side (if bilateral, select both right and left) Right   Observation hammer toes w/ irritation 2nd toes;  increased prominance--pump bump   Palpation severe tenderness R ITB and medial aspect of R achilles.  Mod tenderness R gastroc   Gait/Locomotion slight limp   Foot Position In Standing mild increased pronation   Ankle/Foot Strength Comments Hip ABD R 4-/5, L 5/5   Right Gastroc (in WB) Flexibility R 25*, L 28*   Right Soleus (in WB) Flexibility R 20*, L 30*   Right PF/Inversion Strength 5/5   Right PF/Eversion Strength 4/5   Right DF (Knee Ext) AROM R 0*, L 5*   Right PF AROM 45* B   Right Calcanceal Inversion AROM B WFL   Right Calcaneal Eversion AROM R 12*, L 20*   Right DF/Inversion Strength 5/5   Right DF/Eversion Strength 5/5   Planned Therapy Interventions   Planned Therapy Interventions manual therapy;joint mobilization;neuromuscular re-education;ROM;strengthening;stretching   Planned Modality Interventions   Planned Modality Interventions Ultrasound;Iontophoresis   Clinical Impression   Criteria for Skilled Therapeutic Interventions Met yes, treatment indicated   PT Diagnosis chronic achilles tendinopathy   Influenced by the following impairments pain, stiffness, decreased strength   Functional limitations due to impairments walking, wearing tennis shoes, lying down   Clinical Presentation Evolving/Changing   Clinical Presentation Rationale pt notes symptoms are worsening   Clinical Decision Making (Complexity) Low complexity   Therapy Frequency 1 time/week  (if we  add iontophoresis then would increase to 2X/wk)   Predicted Duration of Therapy Intervention (days/wks) 6 weeks   Risk & Benefits of therapy have been explained Yes   Patient, Family & other staff in agreement with plan of care Yes   Education Assessment   Preferred Learning Style Listening;Reading;Demonstration;Pictures/video   Barriers to Learning No barriers   ORTHO GOALS   PT Ortho Eval Goals 1;2;3;4   Ortho Goal 1   Goal Identifier 1.   Goal Description Pt will walk w/o a limp   Target Date 05/12/23   Ortho Goal 2   Goal Identifier 2.   Goal Description Pt will be able to wear tennis shoes X 30 min w/ pain no > 3/10   Target Date 06/01/23   Ortho Goal 3   Goal Identifier 3.   Goal Description Pt will be able to lie supine w/o needing to prop up the foot   Target Date 06/01/23   Ortho Goal 4   Goal Identifier 4.   Goal Description Pt will be independent and consistent w/HEP to manage symptoms   Target Date 06/01/23   Total Evaluation Time   PT Eval, Low Complexity Minutes (75688) 25     Thank you for this referral,    Savanah Lovelace, PT,   #[id number]  [facility]  [phone]

## 2023-05-22 ENCOUNTER — TELEPHONE (OUTPATIENT)
Dept: FAMILY MEDICINE | Facility: CLINIC | Age: 58
End: 2023-05-22
Payer: COMMERCIAL

## 2023-05-22 NOTE — TELEPHONE ENCOUNTER
Patient Quality Outreach    Patient is due for the following:   Colon Cancer Screening  Breast Cancer Screening - Mammogram  Physical Preventive Adult Physical    Next Steps:   Schedule a Adult Preventative    Type of outreach:    Sent [employer] message.    Next Steps:  Reach out within 90 days via [employer].    Max number of attempts reached: No. Will try again in 90 days if patient still on fail list.    Questions for provider review:    None           Caroline Gonzalez MA

## 2023-05-26 NOTE — PROGRESS NOTES
Discharge Note -Physical Therapy    NAME:  Ifeoma Grajeda  MRN:   [id number]       Pt did not follow up for therapy as recommended. No further contacts have been made. Initial evaluation note will serve as final entry. Discharge from PT this date.

## 2023-09-25 ENCOUNTER — PATIENT OUTREACH (OUTPATIENT)
Dept: FAMILY MEDICINE | Facility: CLINIC | Age: 58
End: 2023-09-25
Payer: COMMERCIAL

## 2023-09-25 NOTE — TELEPHONE ENCOUNTER
Patient Quality Outreach    Patient is due for the following:   Colon Cancer Screening  Breast Cancer Screening - Mammogram  Physical Preventive Adult Physical    Next Steps:   Schedule a Adult Preventative    Type of outreach:    Sent [employer] message.    Next Steps:  Reach out within 90 days via [employer].    Max number of attempts reached: Yes. Will try again in 90 days if patient still on fail list.    Questions for provider review:    None           Estee Shahid MA

## 2024-03-28 ENCOUNTER — OFFICE VISIT (OUTPATIENT)
Dept: FAMILY MEDICINE | Facility: CLINIC | Age: 59
End: 2024-03-28
Payer: COMMERCIAL

## 2024-03-28 ENCOUNTER — ORDERS ONLY (AUTO-RELEASED) (OUTPATIENT)
Dept: FAMILY MEDICINE | Facility: CLINIC | Age: 59
End: 2024-03-28

## 2024-03-28 VITALS
OXYGEN SATURATION: 95 % | DIASTOLIC BLOOD PRESSURE: 82 MMHG | BODY MASS INDEX: 35.4 KG/M2 | WEIGHT: 239 LBS | RESPIRATION RATE: 18 BRPM | HEIGHT: 69 IN | TEMPERATURE: 97.5 F | SYSTOLIC BLOOD PRESSURE: 124 MMHG | HEART RATE: 76 BPM

## 2024-03-28 DIAGNOSIS — Z12.11 SCREEN FOR COLON CANCER: ICD-10-CM

## 2024-03-28 DIAGNOSIS — Z00.00 ROUTINE GENERAL MEDICAL EXAMINATION AT A HEALTH CARE FACILITY: Primary | ICD-10-CM

## 2024-03-28 DIAGNOSIS — E66.812 CLASS 2 SEVERE OBESITY DUE TO EXCESS CALORIES WITH SERIOUS COMORBIDITY AND BODY MASS INDEX (BMI) OF 35.0 TO 35.9 IN ADULT (H): ICD-10-CM

## 2024-03-28 DIAGNOSIS — E03.9 HYPOTHYROIDISM, UNSPECIFIED TYPE: Primary | ICD-10-CM

## 2024-03-28 DIAGNOSIS — E78.5 HYPERLIPIDEMIA LDL GOAL <130: ICD-10-CM

## 2024-03-28 DIAGNOSIS — Z12.31 VISIT FOR SCREENING MAMMOGRAM: ICD-10-CM

## 2024-03-28 DIAGNOSIS — M70.61 TROCHANTERIC BURSITIS OF RIGHT HIP: ICD-10-CM

## 2024-03-28 DIAGNOSIS — E03.9 HYPOTHYROIDISM, UNSPECIFIED TYPE: ICD-10-CM

## 2024-03-28 DIAGNOSIS — E66.01 CLASS 2 SEVERE OBESITY DUE TO EXCESS CALORIES WITH SERIOUS COMORBIDITY AND BODY MASS INDEX (BMI) OF 35.0 TO 35.9 IN ADULT (H): ICD-10-CM

## 2024-03-28 DIAGNOSIS — R73.09 ABNORMAL GLUCOSE: ICD-10-CM

## 2024-03-28 LAB
CHOLEST SERPL-MCNC: 172 MG/DL
FASTING STATUS PATIENT QL REPORTED: YES
FASTING STATUS PATIENT QL REPORTED: YES
GLUCOSE SERPL-MCNC: 122 MG/DL (ref 70–99)
HDLC SERPL-MCNC: 54 MG/DL
LDLC SERPL CALC-MCNC: 101 MG/DL
NONHDLC SERPL-MCNC: 118 MG/DL
T4 FREE SERPL-MCNC: 0.79 NG/DL (ref 0.9–1.7)
TRIGL SERPL-MCNC: 86 MG/DL
TSH SERPL DL<=0.005 MIU/L-ACNC: 8.26 UIU/ML (ref 0.3–4.2)

## 2024-03-28 PROCEDURE — 84439 ASSAY OF FREE THYROXINE: CPT | Performed by: NURSE PRACTITIONER

## 2024-03-28 PROCEDURE — 99214 OFFICE O/P EST MOD 30 MIN: CPT | Mod: 25 | Performed by: NURSE PRACTITIONER

## 2024-03-28 PROCEDURE — 82947 ASSAY GLUCOSE BLOOD QUANT: CPT | Performed by: NURSE PRACTITIONER

## 2024-03-28 PROCEDURE — 80061 LIPID PANEL: CPT | Performed by: NURSE PRACTITIONER

## 2024-03-28 PROCEDURE — 36415 COLL VENOUS BLD VENIPUNCTURE: CPT | Performed by: NURSE PRACTITIONER

## 2024-03-28 PROCEDURE — 84443 ASSAY THYROID STIM HORMONE: CPT | Performed by: NURSE PRACTITIONER

## 2024-03-28 PROCEDURE — 99396 PREV VISIT EST AGE 40-64: CPT | Performed by: NURSE PRACTITIONER

## 2024-03-28 RX ORDER — LEVOTHYROXINE SODIUM 50 UG/1
50 TABLET ORAL DAILY
Qty: 90 TABLET | Refills: 1 | Status: SHIPPED | OUTPATIENT
Start: 2024-03-28

## 2024-03-28 RX ORDER — LEVOTHYROXINE SODIUM 25 UG/1
25 TABLET ORAL DAILY
Qty: 90 TABLET | Refills: 3 | Status: SHIPPED | OUTPATIENT
Start: 2024-03-28 | End: 2024-03-28

## 2024-03-28 SDOH — HEALTH STABILITY: PHYSICAL HEALTH: ON AVERAGE, HOW MANY DAYS PER WEEK DO YOU ENGAGE IN MODERATE TO STRENUOUS EXERCISE (LIKE A BRISK WALK)?: 4 DAYS

## 2024-03-28 SDOH — HEALTH STABILITY: PHYSICAL HEALTH: ON AVERAGE, HOW MANY MINUTES DO YOU ENGAGE IN EXERCISE AT THIS LEVEL?: 10 MIN

## 2024-03-28 ASSESSMENT — PAIN SCALES - GENERAL: PAINLEVEL: NO PAIN (0)

## 2024-03-28 ASSESSMENT — SOCIAL DETERMINANTS OF HEALTH (SDOH): HOW OFTEN DO YOU GET TOGETHER WITH FRIENDS OR RELATIVES?: PATIENT DECLINED

## 2024-03-28 NOTE — PROGRESS NOTES
"Preventive Care Visit  [facility]  Salima Verdin DNP, Family Medicine  Mar 28, 2024      Assessment & Plan     Routine general medical examination at a health care facility       Class 2 severe obesity due to excess calories with serious comorbidity and body mass index (BMI) of 35.0 to 35.9 in adult (H)     - Glucose    Trochanteric bursitis of right hip  Discussed preventative and treatment. PT referral.  Exercises given. NSAIDs use sparingly for short periods of time.  Ice.     - Physical Therapy  Referral    Hypothyroidism, unspecified type  Due for TSH.    - TSH WITH FREE T4 REFLEX  - levothyroxine (SYNTHROID/LEVOTHROID) 25 MCG tablet  Dispense: 90 tablet; Refill: 3    Screen for colon cancer     - COLPASTOR([employer])    Hyperlipidemia LDL goal <130     - Lipid panel reflex to direct LDL Non-fasting    Visit for screening mammogram     - MA SCREENING DIGITAL BILAT - Future  (s+30)    Abnormal glucose     Declined immunizations today.      Patient has been advised of split billing requirements and indicates understanding: Yes         BMI  Estimated body mass index is 35.81 kg/m  as calculated from the following:    Height as of this encounter: 1.74 m (5' 8.5\").    Weight as of this encounter: 108.4 kg (239 lb).   Weight management plan: Discussed healthy diet and exercise guidelines    Counseling  Appropriate preventive services were discussed with this patient, including applicable screening as appropriate for fall prevention, nutrition, physical activity, Tobacco-use cessation, weight loss and cognition.  Checklist reviewing preventive services available has been given to the patient.  Reviewed patient's diet, addressing concerns and/or questions.   The patient was instructed to see the dentist every 6 months.       See Patient Instructions    Laurie Dia is a 59 year old, presenting for the following:  Physical and Thyroid Problem (Med refills)        " 3/28/2024     7:46 AM   Additional Questions   Roomed by Agata cosby CMA   Accompanied by self         3/28/2024     7:46 AM   Patient Reported Additional Medications   Patient reports taking the following new medications None        Health Care Directive  Patient does not have a Health Care Directive or Living Will: Discussed advance care planning with patient; however, patient declined at this time.    HPI      Hypothyroidism Follow-up    Since last visit, patient describes the following symptoms: Weight stable, no hair loss, no skin changes, no constipation, no loose stools, dry skin, and loose stools    Reports right hip radiates to upper thigh for the past couple months.  Worse with sitting - difficulty going from sitting to standing.  Improved with putting padding under buttocks.    Keeps her awake at night until she can find a comfortable position  Taking Tylenol prn - which helps.  No trauma or injury.  Has been more sedentary lately.        3/28/2024   General Health   How would you rate your overall physical health? Good   Feel stress (tense, anxious, or unable to sleep) Very much   (!) STRESS CONCERN      3/28/2024   Nutrition   Three or more servings of calcium each day? (!) NO   Diet: Other   If other, please elaborate: no diet   How many servings of fruit and vegetables per day? (!) 0-1   How many sweetened beverages each day? (!) 2         3/28/2024   Exercise   Days per week of moderate/strenous exercise 4 days   Average minutes spent exercising at this level 10 min         3/28/2024   Social Factors   Frequency of gathering with friends or relatives Patient declined   Worry food won't last until get money to buy more No   Food not last or not have enough money for food? No   Do you have housing?  Yes   Are you worried about losing your housing? No   Lack of transportation? No   Unable to get utilities (heat,electricity)? No          No data to display                   3/28/2024   Dental   Dentist two  "times every year? (!) NO         3/28/2024   TB Screening   Were you born outside of the [place]? No         Today's PHQ-2 Score:       3/28/2024     7:40 AM   PHQ-2 ( 1999 [employer])   Q1: Little interest or pleasure in doing things 0   Q2: Feeling down, depressed or hopeless 0   PHQ-2 Score 0   Q1: Little interest or pleasure in doing things Not at all   Q2: Feeling down, depressed or hopeless Not at all   PHQ-2 Score 0           3/28/2024   Substance Use   Alcohol more than 3/day or more than 7/wk No   Do you use any other substances recreationally? No     Social History     Tobacco Use    Smoking status: Never    Smokeless tobacco: Never   Vaping Use    Vaping Use: Never used   Substance Use Topics    Alcohol use: Yes     Comment: ocassionaly    Drug use: No             3/28/2024   Breast Cancer Screening   Family history of breast, colon, or ovarian cancer? Yes         3/28/2024   LAST FHS-7 RESULTS   1st degree relative breast or ovarian cancer Yes   Any relative bilateral breast cancer Unknown   Any male have breast cancer Yes   Any ONE woman have BOTH breast AND ovarian cancer No   Any woman with breast cancer before 50yrs No   2 or more relatives with breast AND/OR ovarian cancer Yes   2 or more relatives with breast AND/OR bowel cancer Unknown      Mammogram Screening - Mammogram every 1-2 years updated in Health Maintenance based on mutual decision making          3/28/2024   One time HIV Screening   Previous HIV test? No         3/28/2024   STI Screening   New sexual partner(s) since last STI/HIV test? No     History of abnormal Pap smear: NO - age 30-65 PAP every 5 years with negative HPV co-testing recommended  Last 3 Pap Results: No results found for: \"PAP\"       ASCVD Risk   The ASCVD Risk score (Emperatriz MICHEL, et al., 2019) failed to calculate for the following reasons:    Cannot find a previous HDL lab    Cannot find a previous total cholesterol lab    Fracture Risk Assessment Tool  Link to Frax " Calculator  Use the information below to complete the Frax calculator  : 1965  Sex: female  Weight (kg): 108.4 kg (actual weight)  Height (cm): 174 cm  Previous Fragility Fracture:  No  History of parent with fractured hip:  No  Current Smoking:  No  Patient has been on glucocorticoids for more than 3 months (5mg/day or more): No  Rheumatoid Arthritis on Problem List:  No  Secondary Osteoporosis on Problem List:  No  Consumes 3 or more units of alcohol per day: No  Femoral Neck BMD (g/cm2)     Reviewed and updated as needed this visit by Provider                    Past Medical History:   Diagnosis Date    Hx of hysterectomy     Hypothyroid      Past Surgical History:   Procedure Laterality Date    HC REMOVAL GALLBLADDER      HYSTERECTOMY TOTAL ABDOMINAL       OB History   No obstetric history on file.     Lab work is in process  Labs reviewed in EPIC  BP Readings from Last 3 Encounters:   24 124/82   23 122/84   23 137/82    Wt Readings from Last 3 Encounters:   24 108.4 kg (239 lb)   23 107.2 kg (236 lb 6.4 oz)   23 101.2 kg (223 lb)                  Patient Active Problem List   Diagnosis    Hyperlipidemia LDL goal <130    Hypothyroidism    Abnormal glucose    Urge incontinence of urine    Class 2 severe obesity due to excess calories with serious comorbidity in adult (H)     Past Surgical History:   Procedure Laterality Date    HC REMOVAL GALLBLADDER      HYSTERECTOMY TOTAL ABDOMINAL         Social History     Tobacco Use    Smoking status: Never    Smokeless tobacco: Never   Substance Use Topics    Alcohol use: Yes     Comment: ocassionaly     Family History   Problem Relation Age of Onset    Osteoporosis Mother     Cancer Mother     Alzheimer Disease Mother     Thyroid Disease Mother     Cancer Father         Lung         Current Outpatient Medications   Medication Sig Dispense Refill    cyclobenzaprine (FLEXERIL) 10 MG tablet Take 1 tablet (10 mg) by mouth 3 times  "daily as needed for muscle spasms 14 tablet 0    levothyroxine (SYNTHROID/LEVOTHROID) 25 MCG tablet Take 1 tablet (25 mcg) by mouth daily 90 tablet 3     Allergies   Allergen Reactions    Ciprofloxacin Itching     Recent Labs   Lab Test 03/02/23  1402 12/22/20  1341   CR  --  0.55   GFRESTIMATED  --  >90   GFRESTBLACK  --  >90   POTASSIUM  --  3.2*   TSH 6.22*  --           Review of Systems  Constitutional, HEENT, cardiovascular, pulmonary, GI, , musculoskeletal, neuro, skin, endocrine and psych systems are negative, except as otherwise noted.     Objective    Exam  /82 (BP Location: Right arm, Patient Position: Sitting, Cuff Size: Adult Regular)   Pulse 76   Temp 97.5  F (36.4  C) (Tympanic)   Resp 18   Ht 1.74 m (5' 8.5\")   Wt 108.4 kg (239 lb)   SpO2 95%   BMI 35.81 kg/m     Estimated body mass index is 35.81 kg/m  as calculated from the following:    Height as of this encounter: 1.74 m (5' 8.5\").    Weight as of this encounter: 108.4 kg (239 lb).    Physical Exam  GENERAL: alert and no distress  EYES: Eyes grossly normal to inspection, PERRL and conjunctivae and sclerae normal  HENT: ear canals and TM's normal, nose and mouth without ulcers or lesions  NECK: no adenopathy, no asymmetry, masses, or scars  RESP: lungs clear to auscultation - no rales, rhonchi or wheezes  CV: regular rate and rhythm, normal S1 S2, no S3 or S4, no murmur, click or rub, no peripheral edema  ABDOMEN: soft, nontender, no hepatosplenomegaly, no masses and bowel sounds normal  MS: extremities normal- no gross deformities noted, right tenderness overlying greater trochanteric bursa, full ROM, FABERs negative.  SKIN: no suspicious lesions or rashes  NEURO: Normal strength and tone, mentation intact and speech normal  PSYCH: mentation appears normal, affect normal/bright        Signed Electronically by: Salima Verdin DNP    "

## 2024-03-28 NOTE — PATIENT INSTRUCTIONS
Preventive Care Advice   This is general advice given by our system to help you stay healthy. However, your care team may have specific advice just for you. Please talk to your care team about your preventive care needs.  Nutrition  Eat 5 or more servings of fruits and vegetables each day.  Try wheat bread, brown rice and whole grain pasta (instead of white bread, rice, and pasta).  Get enough calcium and vitamin D. Check the label on foods and aim for 100% of the RDA (recommended daily allowance).  Lifestyle  Exercise at least 150 minutes each week   (30 minutes a day, 5 days a week).  Do muscle strengthening activities 2 days a week. These help control your weight and prevent disease.  No smoking.  Wear sunscreen to prevent skin cancer.  Have a dental exam and cleaning every 6 months.  Yearly exams  See your health care team every year to talk about:  Any changes in your health.  Any medicines your care team has prescribed.  Preventive care, family planning, and ways to prevent chronic diseases.  Shots (vaccines)   HPV shots (up to age 26), if you've never had them before.  Hepatitis B shots (up to age 59), if you've never had them before.  COVID-19 shot: Get this shot when it's due.  Flu shot: Get a flu shot every year.  Tetanus shot: Get a tetanus shot every 10 years.  Pneumococcal, hepatitis A, and RSV shots: Ask your care team if you need these based on your risk.  Shingles shot (for age 50 and up).  General health tests  Diabetes screening:  Starting at age 35, Get screened for diabetes at least every 3 years.  If you are younger than age 35, ask your care team if you should be screened for diabetes.  Cholesterol test: At age 39, start having a cholesterol test every 5 years, or more often if advised.  Bone density scan (DEXA): At age 50, ask your care team if you should have this scan for osteoporosis (brittle bones).  Hepatitis C: Get tested at least once in your life.  STIs (sexually transmitted  infections)  Before age 24: Ask your care team if you should be screened for STIs.  After age 24: Get screened for STIs if you're at risk. You are at risk for STIs (including HIV) if:  You are sexually active with more than one person.  You don't use condoms every time.  You or a partner was diagnosed with a sexually transmitted infection.  If you are at risk for HIV, ask about PrEP medicine to prevent HIV.  Get tested for HIV at least once in your life, whether you are at risk for HIV or not.  Cancer screening tests  Cervical cancer screening: If you have a cervix, begin getting regular cervical cancer screening tests at age 21. Most people who have regular screenings with normal results can stop after age 65. Talk about this with your provider.  Breast cancer scan (mammogram): If you've ever had breasts, begin having regular mammograms starting at age 40. This is a scan to check for breast cancer.  Colon cancer screening: It is important to start screening for colon cancer at age 45.  Have a colonoscopy test every 10 years (or more often if you're at risk) Or, ask your provider about stool tests like a FIT test every year or Cologuard test every 3 years.  To learn more about your testing options, visit: https://www.Go Try It On/689786.pdf.  For help making a decision, visit: https://bit.ly/tl04987.  Prostate cancer screening test: If you have a prostate and are age 55 to 69, ask your provider if you would benefit from a yearly prostate cancer screening test.  Lung cancer screening: If you are a current or former smoker age 50 to 80, ask your care team if ongoing lung cancer screenings are right for you.  For informational purposes only. Not to replace the advice of your health care provider. Copyright   2023 [employer]. All rights reserved. Clinically reviewed by the [facility]itions [facility]. [employer] [id number] - REV 01/24.    Learning About Stress  What is stress?     Stress is your  body's response to a hard situation. Your body can have a physical, emotional, or mental response. Stress is a fact of life for most people, and it affects everyone differently. What causes stress for you may not be stressful for someone else.  A lot of things can cause stress. You may feel stress when you go on a job interview, take a test, or run a race. This kind of short-term stress is normal and even useful. It can help you if you need to work hard or react quickly. For example, stress can help you finish an important job on time.  Long-term stress is caused by ongoing stressful situations or events. Examples of long-term stress include long-term health problems, ongoing problems at work, or conflicts in your family. Long-term stress can harm your health.  How does stress affect your health?  When you are stressed, your body responds as though you are in danger. It makes hormones that speed up your heart, make you breathe faster, and give you a burst of energy. This is called the fight-or-flight stress response. If the stress is over quickly, your body goes back to normal and no harm is done.  But if stress happens too often or lasts too long, it can have bad effects. Long-term stress can make you more likely to get sick, and it can make symptoms of some diseases worse. If you tense up when you are stressed, you may develop neck, shoulder, or low back pain. Stress is linked to high blood pressure and heart disease.  Stress also harms your emotional health. It can make you vasquez, tense, or depressed. Your relationships may suffer, and you may not do well at work or school.  What can you do to manage stress?  You can try these things to help manage stress:   Do something active. Exercise or activity can help reduce stress. Walking is a great way to get started. Even everyday activities such as housecleaning or yard work can help.  Try yoga or manuel chi. These techniques combine exercise and meditation. You may need  some training at first to learn them.  Do something you enjoy. For example, listen to music or go to a movie. Practice your hobby or do volunteer work.  Meditate. This can help you relax, because you are not worrying about what happened before or what may happen in the future.  Do guided imagery. Imagine yourself in any setting that helps you feel calm. You can use online videos, books, or a teacher to guide you.  Do breathing exercises. For example:  From a standing position, bend forward from the waist with your knees slightly bent. Let your arms dangle close to the floor.  Breathe in slowly and deeply as you return to a standing position. Roll up slowly and lift your head last.  Hold your breath for just a few seconds in the standing position.  Breathe out slowly and bend forward from the waist.  Let your feelings out. Talk, laugh, cry, and express anger when you need to. Talking with supportive friends or family, a counselor, or a vivien leader about your feelings is a healthy way to relieve stress. Avoid discussing your feelings with people who make you feel worse.  Write. It may help to write about things that are bothering you. This helps you find out how much stress you feel and what is causing it. When you know this, you can find better ways to cope.  What can you do to prevent stress?  You might try some of these things to help prevent stress:  Manage your time. This helps you find time to do the things you want and need to do.  Get enough sleep. Your body recovers from the stresses of the day while you are sleeping.  Get support. Your family, friends, and community can make a difference in how you experience stress.  Limit your news feed. Avoid or limit time on social media or news that may make you feel stressed.  Do something active. Exercise or activity can help reduce stress. Walking is a great way to get started.  Where can you learn more?  Go to https://www.healthwise.net/patiented  Enter N032 in the  "search box to learn more about \"Learning About Stress.\"  Current as of: October 24, 2023               Content Version: 14.0    6764-2131 [employer].   Care instructions adapted under license by your healthcare professional. If you have questions about a medical condition or this instruction, always ask your healthcare professional. [employer] disclaims any warranty or liability for your use of this information.      "

## 2024-06-16 ENCOUNTER — HEALTH MAINTENANCE LETTER (OUTPATIENT)
Age: 59
End: 2024-06-16

## 2024-06-18 ENCOUNTER — TELEPHONE (OUTPATIENT)
Dept: FAMILY MEDICINE | Facility: CLINIC | Age: 59
End: 2024-06-18
Payer: COMMERCIAL

## 2024-06-18 NOTE — TELEPHONE ENCOUNTER
Patient Quality Outreach    Patient is due for the following:   Colon Cancer Screening  Breast Cancer Screening - Mammogram    Next Steps:   Due for colon screen and mammo    Type of outreach:    Sent [employer] message.      Questions for provider review:    None           Alli aCi, CMA

## 2024-06-27 ENCOUNTER — MYC MEDICAL ADVICE (OUTPATIENT)
Dept: ADMISSION | Facility: CLINIC | Age: 59
End: 2024-06-27
Payer: COMMERCIAL

## 2024-06-28 NOTE — TELEPHONE ENCOUNTER
Was tx call from central scheduling.  Pt needs to make VV to discuss FMLA forms.no availability until 7/3.  PCP has first avail VV on 7/3. Pt agrees & appt was scheduled.    Dianna Contreras RN

## 2024-07-03 ENCOUNTER — VIRTUAL VISIT (OUTPATIENT)
Dept: FAMILY MEDICINE | Facility: CLINIC | Age: 59
End: 2024-07-03
Payer: COMMERCIAL

## 2024-07-03 DIAGNOSIS — Z02.89 ENCOUNTER FOR COMPLETION OF FORM WITH PATIENT: Primary | ICD-10-CM

## 2024-07-03 PROCEDURE — 99442 PR PHYSICIAN TELEPHONE EVALUATION 11-20 MIN: CPT | Mod: 93 | Performed by: NURSE PRACTITIONER

## 2024-07-03 NOTE — PROGRESS NOTES
Ifeoma is a 59 year old who is being evaluated via a billable telephone visit.    What phone number would you like to be contacted at? [phone]  How would you like to obtain your AVS? MyChart  Originating Location (pt. Location): Home    Distant Location (provider location):  On-site    Assessment & Plan     Encounter for completion of form with patient  FMLA completion given son's MVA and TBI      See Patient Instructions    Subjective   Ifeoma is a 59 year old, presenting for the following health issues:  Forms (Would like to go over FMLA forms, her son got in a bad for mary accident and she has been his caretaker. 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  Currently working  Currently needing time off      Review of Systems  Constitutional, neuro, ENT, endocrine, pulmonary, cardiac, gastrointestinal, genitourinary, musculoskeletal, integument and psychiatric systems are negative, except as otherwise noted.      Objective           Vitals:  No vitals were obtained today due to virtual visit.    Physical Exam   General: Alert and no distress //Respiratory: No audible wheeze, cough, or shortness of breath // Psychiatric:  Appropriate affect, tone, and pace of words             Phone call duration: 17 minutes  Signed Electronically by: Salima Verdin DNP

## 2024-07-05 ENCOUNTER — TELEPHONE (OUTPATIENT)
Dept: FAMILY MEDICINE | Facility: CLINIC | Age: 59
End: 2024-07-05
Payer: COMMERCIAL

## 2024-07-05 NOTE — TELEPHONE ENCOUNTER
FMLA forms signed and completed.  Faxed to [place] at [phone].  Original sent to patient in the mail.  Copy placed in cabinet drawer.  Pt is aware this has been done.    Seble Llanos on 7/5/2024 at 11:39 AM

## 2024-08-28 LAB — NONINV COLON CA DNA+OCC BLD SCRN STL QL: NEGATIVE

## 2024-10-16 ENCOUNTER — TELEPHONE (OUTPATIENT)
Dept: FAMILY MEDICINE | Facility: CLINIC | Age: 59
End: 2024-10-16
Payer: COMMERCIAL

## 2024-10-16 NOTE — TELEPHONE ENCOUNTER
Patient Quality Outreach    Patient is due for the following:   Breast Cancer Screening - Mammogram    Next Steps:   Due for mammo     Type of outreach:    Sent [employer] message.      Questions for provider review:    None           Alli Cai, CMA

## 2024-12-11 ENCOUNTER — HOSPITAL ENCOUNTER (OUTPATIENT)
Dept: MAMMOGRAPHY | Facility: CLINIC | Age: 59
Discharge: HOME | End: 2024-12-11
Attending: NURSE PRACTITIONER
Payer: COMMERCIAL

## 2024-12-11 DIAGNOSIS — Z12.31 VISIT FOR SCREENING MAMMOGRAM: ICD-10-CM

## 2024-12-11 PROCEDURE — 77063 BREAST TOMOSYNTHESIS BI: CPT

## 2024-12-12 DIAGNOSIS — R92.8 ABNORMAL MAMMOGRAM: Primary | ICD-10-CM

## 2024-12-23 ENCOUNTER — HOSPITAL ENCOUNTER (OUTPATIENT)
Dept: ULTRASOUND IMAGING | Facility: CLINIC | Age: 59
Discharge: HOME | End: 2024-12-23
Attending: NURSE PRACTITIONER
Payer: COMMERCIAL

## 2024-12-23 ENCOUNTER — HOSPITAL ENCOUNTER (OUTPATIENT)
Dept: MAMMOGRAPHY | Facility: CLINIC | Age: 59
Discharge: HOME | End: 2024-12-23
Attending: NURSE PRACTITIONER
Payer: COMMERCIAL

## 2024-12-23 DIAGNOSIS — R92.8 ABNORMAL MAMMOGRAM: ICD-10-CM

## 2024-12-23 PROCEDURE — 76642 ULTRASOUND BREAST LIMITED: CPT | Mod: RT

## 2024-12-23 PROCEDURE — 77065 DX MAMMO INCL CAD UNI: CPT | Mod: RT

## 2024-12-26 ENCOUNTER — HOSPITAL ENCOUNTER (EMERGENCY)
Facility: CLINIC | Age: 59
Discharge: HOME | End: 2024-12-26
Payer: COMMERCIAL

## 2024-12-27 ENCOUNTER — HOSPITAL ENCOUNTER (EMERGENCY)
Facility: CLINIC | Age: 59
Discharge: HOME | End: 2024-12-27
Attending: EMERGENCY MEDICINE | Admitting: EMERGENCY MEDICINE
Payer: COMMERCIAL

## 2024-12-27 ENCOUNTER — APPOINTMENT (OUTPATIENT)
Dept: GENERAL RADIOLOGY | Facility: CLINIC | Age: 59
End: 2024-12-27
Attending: EMERGENCY MEDICINE
Payer: COMMERCIAL

## 2024-12-27 VITALS
OXYGEN SATURATION: 94 % | SYSTOLIC BLOOD PRESSURE: 132 MMHG | WEIGHT: 230 LBS | DIASTOLIC BLOOD PRESSURE: 75 MMHG | RESPIRATION RATE: 20 BRPM | BODY MASS INDEX: 34.86 KG/M2 | HEIGHT: 68 IN | HEART RATE: 79 BPM | TEMPERATURE: 99.1 F

## 2024-12-27 DIAGNOSIS — J18.9 COMMUNITY ACQUIRED PNEUMONIA OF LEFT LOWER LOBE OF LUNG: ICD-10-CM

## 2024-12-27 PROCEDURE — 71046 X-RAY EXAM CHEST 2 VIEWS: CPT

## 2024-12-27 PROCEDURE — 250N000013 HC RX MED GY IP 250 OP 250 PS 637: Performed by: EMERGENCY MEDICINE

## 2024-12-27 PROCEDURE — 99284 EMERGENCY DEPT VISIT MOD MDM: CPT | Performed by: EMERGENCY MEDICINE

## 2024-12-27 PROCEDURE — 99284 EMERGENCY DEPT VISIT MOD MDM: CPT | Mod: 25 | Performed by: EMERGENCY MEDICINE

## 2024-12-27 PROCEDURE — 250N000012 HC RX MED GY IP 250 OP 636 PS 637: Performed by: EMERGENCY MEDICINE

## 2024-12-27 RX ORDER — AZITHROMYCIN 250 MG/1
500 TABLET, FILM COATED ORAL ONCE
Status: COMPLETED | OUTPATIENT
Start: 2024-12-27 | End: 2024-12-27

## 2024-12-27 RX ORDER — PREDNISONE 20 MG/1
TABLET ORAL
Qty: 10 TABLET | Refills: 0 | Status: SHIPPED | OUTPATIENT
Start: 2024-12-27

## 2024-12-27 RX ORDER — PREDNISONE 20 MG/1
40 TABLET ORAL ONCE
Status: COMPLETED | OUTPATIENT
Start: 2024-12-27 | End: 2024-12-27

## 2024-12-27 RX ORDER — AZITHROMYCIN 250 MG/1
TABLET, FILM COATED ORAL
Qty: 6 TABLET | Refills: 0 | Status: SHIPPED | OUTPATIENT
Start: 2024-12-27 | End: 2025-01-01

## 2024-12-27 RX ORDER — ALBUTEROL SULFATE 90 UG/1
2 INHALANT RESPIRATORY (INHALATION) EVERY 6 HOURS PRN
Qty: 18 G | Refills: 0 | Status: SHIPPED | OUTPATIENT
Start: 2024-12-27

## 2024-12-27 RX ADMIN — AMOXICILLIN AND CLAVULANATE POTASSIUM 1 TABLET: 875; 125 TABLET, COATED ORAL at 22:59

## 2024-12-27 RX ADMIN — AZITHROMYCIN DIHYDRATE 500 MG: 250 TABLET, FILM COATED ORAL at 22:59

## 2024-12-27 RX ADMIN — PREDNISONE 40 MG: 20 TABLET ORAL at 23:00

## 2024-12-27 ASSESSMENT — COLUMBIA-SUICIDE SEVERITY RATING SCALE - C-SSRS
2. HAVE YOU ACTUALLY HAD ANY THOUGHTS OF KILLING YOURSELF IN THE PAST MONTH?: NO
6. HAVE YOU EVER DONE ANYTHING, STARTED TO DO ANYTHING, OR PREPARED TO DO ANYTHING TO END YOUR LIFE?: NO
1. IN THE PAST MONTH, HAVE YOU WISHED YOU WERE DEAD OR WISHED YOU COULD GO TO SLEEP AND NOT WAKE UP?: NO

## 2024-12-27 ASSESSMENT — ACTIVITIES OF DAILY LIVING (ADL): ADLS_ACUITY_SCORE: 41

## 2024-12-28 ASSESSMENT — ENCOUNTER SYMPTOMS
DIARRHEA: 0
CONFUSION: 0
EYE DISCHARGE: 0
COUGH: 1
MYALGIAS: 0
VOMITING: 0
HEADACHES: 0
FEVER: 1
SHORTNESS OF BREATH: 1
FATIGUE: 1
ADENOPATHY: 0
AGITATION: 0
FREQUENCY: 0
COLOR CHANGE: 0
NAUSEA: 0
LIGHT-HEADEDNESS: 0
SORE THROAT: 0
WEAKNESS: 1
ABDOMINAL PAIN: 0
DYSURIA: 0
CHILLS: 0

## 2024-12-28 NOTE — DISCHARGE INSTRUCTIONS
Take the antibiotics as prescribed  Prednisone to decrease airway reactivity.  Albuterol inhaler to help with wheezing  Return if increasing shortness of breath, vomiting

## 2024-12-28 NOTE — ED PROVIDER NOTES
History     Chief Complaint   Patient presents with    Cough     HPI  Ifeoma Grajeda is a 59 year old female who presents with about a week history of cough.  She reports some pain in her upper chest related to her coughing.  She also reports generalized weakness.  She has had a low-grade fever.  Her cough has been productive of green sputum.  She has no nausea or vomiting.  She denies abdominal pain.  She has had some exposures to illness over the holidays.  She did use a albuterol inhaler which she obtained from work with some improvement.        Allergies:  Allergies   Allergen Reactions    Ciprofloxacin Itching       Problem List:    Patient Active Problem List    Diagnosis Date Noted    Class 2 severe obesity due to excess calories with serious comorbidity in adult (H) 03/28/2024     Priority: Medium    Urge incontinence of urine 09/09/2014     Priority: Medium    Abnormal glucose 05/06/2014     Priority: Medium     Problem list name updated by automated process. Provider to review      Hypothyroidism 10/19/2012     Priority: Medium    Hyperlipidemia LDL goal <130 09/27/2012     Priority: Medium        Past Medical History:    Past Medical History:   Diagnosis Date    Hx of hysterectomy     Hypothyroid        Past Surgical History:    Past Surgical History:   Procedure Laterality Date    HC REMOVAL GALLBLADDER      HYSTERECTOMY TOTAL ABDOMINAL         Family History:    Family History   Problem Relation Age of Onset    Osteoporosis Mother     Cancer Mother     Alzheimer Disease Mother     Thyroid Disease Mother     Cancer Father         Lung       Social History:  Marital Status:   [2]  Social History     Tobacco Use    Smoking status: Never    Smokeless tobacco: Never   Vaping Use    Vaping status: Never Used   Substance Use Topics    Alcohol use: Yes     Comment: ocassionaly    Drug use: No        Medications:    albuterol (PROAIR HFA/PROVENTIL HFA/VENTOLIN HFA) 108 (90 Base) MCG/ACT  "inhaler  amoxicillin-clavulanate (AUGMENTIN) 875-125 MG tablet  azithromycin (ZITHROMAX) 250 MG tablet  predniSONE (DELTASONE) 20 MG tablet  cyclobenzaprine (FLEXERIL) 10 MG tablet  levothyroxine (SYNTHROID/LEVOTHROID) 50 MCG tablet          Review of Systems   Constitutional:  Positive for fatigue and fever. Negative for chills.   HENT:  Negative for congestion and sore throat.    Eyes:  Negative for discharge.   Respiratory:  Positive for cough and shortness of breath.    Cardiovascular:  Positive for chest pain. Negative for leg swelling.   Gastrointestinal:  Negative for abdominal pain, diarrhea, nausea and vomiting.   Genitourinary:  Negative for dysuria and frequency.   Musculoskeletal:  Negative for myalgias.   Skin:  Negative for color change and rash.   Neurological:  Positive for weakness. Negative for light-headedness and headaches.   Hematological:  Negative for adenopathy.   Psychiatric/Behavioral:  Negative for agitation, behavioral problems and confusion.        Physical Exam   BP: 132/83  Pulse: 84  Temp: 99  F (37.2  C)  Resp: 20  Height: 172.7 cm (5' 8\")  Weight: 104.3 kg (230 lb)  SpO2: 95 %      Physical Exam  Constitutional:       General: She is not in acute distress.     Appearance: She is well-developed.   HENT:      Head: Normocephalic and atraumatic.   Eyes:      Conjunctiva/sclera: Conjunctivae normal.      Pupils: Pupils are equal, round, and reactive to light.   Neck:      Thyroid: No thyromegaly.      Trachea: No tracheal deviation.   Cardiovascular:      Rate and Rhythm: Normal rate and regular rhythm.      Heart sounds: Normal heart sounds. No murmur heard.  Pulmonary:      Effort: Pulmonary effort is normal. No respiratory distress.      Breath sounds: Wheezing and rhonchi (left base) present.   Chest:      Chest wall: No tenderness.   Abdominal:      General: There is no distension.      Palpations: Abdomen is soft.      Tenderness: There is no abdominal tenderness. "   Musculoskeletal:         General: No tenderness.      Cervical back: Normal range of motion and neck supple.   Skin:     General: Skin is warm.      Findings: No rash.   Neurological:      Mental Status: She is alert and oriented to person, place, and time.      Sensory: No sensory deficit.   Psychiatric:         Behavior: Behavior normal.         ED Course        Procedures             Critical Care time:  none         Results for orders placed or performed during the hospital encounter of 12/27/24 (from the past 24 hours)   Chest XR,  PA & LAT    Narrative    EXAM: XR CHEST 2 VIEWS  LOCATION: [facility]  DATE: 12/27/2024    INDICATION: cough  COMPARISON: None.      Impression    IMPRESSION: Heart size is normal. Patchy airspace disease noted in the left lower lobe compatible with developing pneumonia. Upper lobes are clear. No effusions or pneumothorax. Mediastinum and bony structures are unremarkable.       Medications   amoxicillin-clavulanate (AUGMENTIN) 875-125 MG per tablet 1 tablet (1 tablet Oral $Given 12/27/24 2259)   azithromycin (ZITHROMAX) tablet 500 mg (500 mg Oral $Given 12/27/24 2259)   predniSONE (DELTASONE) tablet 40 mg (40 mg Oral $Given 12/27/24 2300)       Assessments & Plan (with Medical Decision Making)     I have reviewed the nursing notes.    I have reviewed the findings, diagnosis, plan and need for follow up with the patient.    59 year old female who presents with about a week history of cough.  She reports some pain in her upper chest related to her coughing.  She also reports generalized weakness.  She has had a low-grade fever.  Her cough has been productive of green sputum.  She has no nausea or vomiting.  She denies abdominal pain.     On exam, patient's blood pressure is 132/83 with a temperature 99 and pulse rate of 84.  Respirations are 20 with O2 saturations at 95%.  She has some generalized wheezing as well as some crackles in the left lung  base.    A chest x-ray was performed and independently read by me.  There is an infiltrate in the left lower lobe.  Patient's symptoms and physical exam are consistent with a community-acquired pneumonia with some airway reactivity.    Patient will be treated with oral antibiotics, steroids, and a Ventolin inhaler.  She does not appear to require admission at this point.  She was instructed return should she develop increasing shortness of breath.        Medical Decision Making  The patient's presentation was of moderate complexity (an acute illness with systemic symptoms).    The patient's evaluation involved:  ordering and/or review of 2 test(s) in this encounter (see separate area of note for details)  independent interpretation of testing performed by another health professional (see separate area of note for details)    The patient's management necessitated moderate risk (prescription drug management including medications given in the ED).        Discharge Medication List as of 12/27/2024 10:50 PM        START taking these medications    Details   albuterol (PROAIR HFA/PROVENTIL HFA/VENTOLIN HFA) 108 (90 Base) MCG/ACT inhaler Inhale 2 puffs into the lungs every 6 hours as needed for shortness of breath, wheezing or cough., Disp-18 g, R-0, [facility] may dispense brand covered by insurance (Proair, or proventil or ventolin or generic albuterol inhaler)      amoxicillin-clavulanate (AUGMENTIN) 875-125 MG tablet Take 1 tablet by mouth 2 times daily for 10 days., Disp-20 tablet, R-0, [employer]      azithromycin (ZITHROMAX) 250 MG tablet Take 2 tablets (500 mg) by mouth daily for 1 day, THEN 1 tablet (250 mg) daily for 4 days., Disp-6 tablet, R-0, [employer]      predniSONE (DELTASONE) 20 MG tablet Take two tablets (= 40mg) each day for 5 (five) days, Disp-10 tablet, R-0, [employer]             Final diagnoses:   Community acquired pneumonia of left lower lobe of lung       12/27/2024   [facility]  [facility] EMERGENCY DEPT       [facility] HospitalMiguel amaro MD  12/28/24 8270

## 2024-12-31 ENCOUNTER — HOSPITAL ENCOUNTER (EMERGENCY)
Facility: CLINIC | Age: 59
Discharge: HOME | End: 2024-12-31
Payer: COMMERCIAL

## 2024-12-31 VITALS
SYSTOLIC BLOOD PRESSURE: 149 MMHG | TEMPERATURE: 98.3 F | DIASTOLIC BLOOD PRESSURE: 90 MMHG | WEIGHT: 230 LBS | HEART RATE: 70 BPM | RESPIRATION RATE: 20 BRPM | BODY MASS INDEX: 34.97 KG/M2 | OXYGEN SATURATION: 97 %

## 2024-12-31 DIAGNOSIS — M79.671 RIGHT FOOT PAIN: ICD-10-CM

## 2024-12-31 DIAGNOSIS — S00.83XA CONTUSION OF FACE, INITIAL ENCOUNTER: ICD-10-CM

## 2024-12-31 DIAGNOSIS — M79.622 PAIN OF LEFT UPPER ARM: Primary | ICD-10-CM

## 2024-12-31 PROCEDURE — 250N000013 HC RX MED GY IP 250 OP 250 PS 637: Performed by: FAMILY MEDICINE

## 2024-12-31 PROCEDURE — 99283 EMERGENCY DEPT VISIT LOW MDM: CPT

## 2024-12-31 RX ORDER — ACETAMINOPHEN 500 MG
1000 TABLET ORAL ONCE
Status: COMPLETED | OUTPATIENT
Start: 2024-12-31 | End: 2024-12-31

## 2024-12-31 RX ORDER — OXYCODONE HYDROCHLORIDE 5 MG/1
5 TABLET ORAL ONCE
Status: DISCONTINUED | OUTPATIENT
Start: 2024-12-31 | End: 2025-01-01 | Stop reason: HOSPADM

## 2024-12-31 RX ADMIN — ACETAMINOPHEN 1000 MG: 500 TABLET ORAL at 13:20

## 2024-12-31 ASSESSMENT — ACTIVITIES OF DAILY LIVING (ADL)
ADLS_ACUITY_SCORE: 41

## 2024-12-31 NOTE — ED TRIAGE NOTES
Arrives from home following fall at gas station just [place], tripped on gas hose hitting cement, denies LOC, goose egg to left side of face, also c/o pain to left shoulder & left foot, ambulatory. C/o blurry vision to left eye, denies nausea/vomiting, not on blood thinners.     Triage Assessment (Adult)       Row Name 12/31/24 1314          Triage Assessment    Airway WDL WDL        Respiratory WDL    Respiratory WDL WDL        Skin Circulation/Temperature WDL    Skin Circulation/Temperature WDL WDL        Cardiac WDL    Cardiac WDL WDL        Peripheral/Neurovascular WDL    Peripheral Neurovascular WDL WDL        Cognitive/Neuro/Behavioral WDL    Cognitive/Neuro/Behavioral WDL X;all     Level of Consciousness alert     Arousal Level opens eyes spontaneously     Orientation oriented x 4     Speech clear;spontaneous;logical     Mood/Behavior calm;cooperative        Pupils (CN II)    Pupil PERRLA yes        Lori Coma Scale    Best Eye Response 4-->(E4) spontaneous     Best Motor Response 6-->(M6) obeys commands     Best Verbal Response 5-->(V5) oriented     [place] Coma Scale Score 15        Motor Response    Motor Response general motor response     General Motor Response purposeful motor response     Left Motor Response purposeful motor response     Right Motor Response purposeful motor response     LUE Motor Response purposeful motor response     RUE Motor Response purposeful motor response     LLE Motor Response purposeful motor response     RLE Motor Response purposeful motor response

## 2025-01-01 NOTE — ED PROVIDER NOTES
"[facility]  Emergency Department Visit Note    PATIENT:  Ifeoma Grajeda     59 year old     female      [id number]    Chief complaint:  Chief Complaint   Patient presents with    Fall    Shoulder Pain    Head Injury          History of present illness:  Patient is a 59 year old female with HLD, elevated BMI presenting for evaluation of left arm and right foot pain after a fall.    Injury occurred around 11 AM.  She was at a gas station when she tripped on a gas hose, causing her to fall to the concrete and strike her head.  Unsure if she lost consciousness.  No antecedent lightheadedness, dizziness, chest pain, shortness of breath, and none of the symptoms currently.  She does not exactly remember the fall.  Does think that she hit her head on the ground and reports her head is \"throbbing\".  Reporting pain primarily over the left shoulder and upper arm as well as right foot.  Also feels like her vision is \"cloudy\".  No vomiting.  No abdominal pain.      Review of Systems:  As in HPI above    BP (!) 149/90   Pulse 70   Temp 98.3  F (36.8  C) (Tympanic)   Resp 20   Wt 104.3 kg (230 lb)   SpO2 97%   BMI 34.97 kg/m        Physical Exam:  Constitutional: laying in hospital bed, alert, oriented, and in no apparent distress at rest though grimaces during exam  HEENT: Left eye superolateral periorbital ecchymosis without laceration, superficial abrasion.  Area mildly tender. Pupils are 2 mm, equal, round, reactive to light bilaterally.  No other external head trauma.  TMs clear bilaterally.  No Kaufman sign or raccoon eyes.  No proptosis.  Neck: able to fully range and no midline tenderness  Cardiovascular: regular rate and rhythm and no murmurs, rubs, or extra heart sounds  Pulmonary: breathing comfortably on room air and lungs clear to auscultation bilaterally  Abdominal: soft, non-tender, non-distended  Extremities/MSK: No obvious visible trauma on inspection of left upper extremity.  " Tenderness to palpation primarily over the left proximal humerus and left shoulder, unable to tolerate passive range of motion due to pain.  No pain to palpation of the elbow or with passive range of motion of the left elbow.  No tenderness over the left forearm or wrist.  Mild tenderness over the dorsum of the right foot without overlying obvious trauma.  Right lower extremity and right upper/left lower extremities otherwise ranged and palpated without tenderness or deformity.  Skin: warm, dry  Neurologic: moves all four extremities spontaneously, GCS 15, CNII-XII intact, 5/5  strength bilaterally, 5/5 strength in dorsiflexion and plantarflexion bilaterally, and sensation intact to light touch in bilateral upper and lower extremities  Psychiatric: calm, appropriate      MDM:  Patient is a 59 year old female with above history presenting for evaluation of traumatic left shoulder and right foot pain after a fall.    Vitals overall reassuring. Exam as above notable primarily for pain with palpation and attempted passive range of motion of the left upper extremity as well as palpation of the dorsum of the right midfoot, and periorbital ecchymosis on the left side.    Etiology of fall sounds mechanical based on history.    Patient low risk for acute intracranial process by [place] CT head.  Had and unfortunately long triage wait time with observation and without apparent decompensation over that time (daughter reports patient at her baseline alertness, mentation, speech).  Similarly low risk by [place] C-spine for C-spine injury, advanced imaging of either not indicated.  Does have mild tenderness over the periorbital ecchymosis and hematoma of the left eye though no evidence of oculomotor involvement or entrapment.  Low concern for retrobulbar hematoma.    Do have concern for humeral or other shoulder fracture, as well as possible fracture of the right midfoot.    Planning for plain film of left shoulder and  humerus as well as right foot.    Unfortunately, due to medical record downtime we were unable to obtain imaging.  Patient and family member decided to go to separate emergency department for further evaluation, which is reasonable.    Disposition discharge. Remainder of ED course below.    ED COURSE:       Encounter Diagnoses:  Final diagnoses:   Pain of left upper arm   Contusion of face, initial encounter   Right foot pain       Final disposition: discharge    Jose Fields MD  12/31/2024  8:39 PM   Emergency Medicine  [facility]      Jose Fields MD  12/31/24 2045

## 2025-01-23 ENCOUNTER — E-VISIT (OUTPATIENT)
Dept: URGENT CARE | Facility: CLINIC | Age: 60
End: 2025-01-23
Payer: COMMERCIAL

## 2025-01-23 DIAGNOSIS — N39.0 ACUTE UTI (URINARY TRACT INFECTION): Primary | ICD-10-CM

## 2025-01-23 RX ORDER — NITROFURANTOIN 25; 75 MG/1; MG/1
100 CAPSULE ORAL 2 TIMES DAILY
Qty: 10 CAPSULE | Refills: 0 | Status: SHIPPED | OUTPATIENT
Start: 2025-01-23 | End: 2025-01-28

## 2025-01-23 NOTE — PATIENT INSTRUCTIONS
Dear Ifeoma Grajeda    After reviewing your responses, I've been able to diagnose you with a urinary tract infection, which is a common infection of the bladder with bacteria.  This is not a sexually transmitted infection, though urinating immediately after intercourse can help prevent infections.  Drinking lots of fluids is also helpful to clear your current infection and prevent the next one.      I have sent a prescription for antibiotics to your pharmacy to treat this infection.    It is important that you take all of your prescribed medication even if your symptoms are improving after a few doses.  Taking all of your medicine helps prevent the symptoms from returning.     If your symptoms worsen, you develop pain in your back or stomach, develop fevers, or are not improving in 5 days, please contact your primary care provider for an appointment or visit any of our convenient Walk-in or Urgent Care Centers to be seen, which can be found on our website here.    Thanks again for choosing us as your health care partner,    Haydee Barros CNP  Urinary Tract Infection (UTI) in Women: Care Instructions  Overview     A urinary tract infection (UTI) is an infection caused by bacteria. It can happen anywhere in the urinary tract. A UTI can happen in the:  Kidneys.  Ureters, the tubes that connect the kidneys to the bladder.  Bladder.  Urethra, where the urine comes out.  Most UTIs are bladder infections. They often cause pain or burning when you urinate.  Most UTIs can be cured with antibiotics. If you are prescribed antibiotics, be sure to complete your treatment so that the infection does not get worse.  Follow-up care is a key part of your treatment and safety. Be sure to make and go to all appointments, and call your doctor if you are having problems. It's also a good idea to know your test results and keep a list of the medicines you take.  How can you care for yourself at home?  Take your antibiotics as  "directed. Do not stop taking them just because you feel better. You need to take the full course of antibiotics.  Drink extra water and other fluids for the next day or two. This will help make the urine less concentrated and help wash out the bacteria that are causing the infection. (If you have kidney, heart, or liver disease and have to limit fluids, talk with your doctor before you increase the amount of fluids you drink.)  Avoid drinks that are carbonated or have caffeine. They can irritate the bladder.  Urinate often. Try to empty your bladder each time.  To relieve pain, take a hot bath or lay a heating pad set on low over your lower belly or genital area. Never go to sleep with a heating pad in place.  To prevent UTIs  Drink plenty of water each day. This helps you urinate often, which clears bacteria from your system. (If you have kidney, heart, or liver disease and have to limit fluids, talk with your doctor before you increase the amount of fluids you drink.)  Urinate when you need to.  If you are sexually active, urinate right after you have sex.  Change sanitary pads often.  Avoid douches, bubble baths, feminine hygiene sprays, and other feminine hygiene products that have deodorants.  After going to the bathroom, wipe from front to back.  When should you call for help?   Call your doctor now or seek immediate medical care if:    You have new or worse fever, chills, nausea, or vomiting.     You have new pain in your back just below your rib cage. This is called flank pain.     There is new blood or pus in your urine.     You have any problems with your antibiotic medicine.   Watch closely for changes in your health, and be sure to contact your doctor if:    You are not getting better after taking an antibiotic for 2 days.     Your symptoms go away but then come back.   Where can you learn more?  Go to https://www.healthwise.net/patiented  Enter K848 in the search box to learn more about \"Urinary Tract " "Infection (UTI) in Women: Care Instructions.\"  Current as of: April 30, 2024  Content Version: 14.3    2024 [employer].   Care instructions adapted under license by your healthcare professional. If you have questions about a medical condition or this instruction, always ask your healthcare professional. [employer] disclaims any warranty or liability for your use of this information.    "

## 2025-01-29 ENCOUNTER — TRANSCRIBE ORDERS (OUTPATIENT)
Dept: ORTHOPEDICS | Facility: CLINIC | Age: 60
End: 2025-01-29
Payer: COMMERCIAL

## 2025-01-29 DIAGNOSIS — S42.202A CLOSED FRACTURE OF PROXIMAL END OF LEFT HUMERUS, UNSPECIFIED FRACTURE MORPHOLOGY, INITIAL ENCOUNTER: Primary | ICD-10-CM

## 2025-01-31 PROBLEM — S42.202A CLOSED FRACTURE OF LEFT PROXIMAL HUMERUS: Status: ACTIVE | Noted: 2025-01-31

## 2025-02-03 ENCOUNTER — THERAPY VISIT (OUTPATIENT)
Dept: PHYSICAL THERAPY | Facility: CLINIC | Age: 60
End: 2025-02-03
Attending: SURGERY
Payer: COMMERCIAL

## 2025-02-03 DIAGNOSIS — S42.202A CLOSED FRACTURE OF LEFT PROXIMAL HUMERUS: Primary | ICD-10-CM

## 2025-02-03 PROCEDURE — 97110 THERAPEUTIC EXERCISES: CPT | Mod: GP

## 2025-02-10 ENCOUNTER — THERAPY VISIT (OUTPATIENT)
Dept: PHYSICAL THERAPY | Facility: CLINIC | Age: 60
End: 2025-02-10
Attending: SURGERY
Payer: COMMERCIAL

## 2025-02-10 DIAGNOSIS — S42.202A CLOSED FRACTURE OF LEFT PROXIMAL HUMERUS: Primary | ICD-10-CM

## 2025-02-10 PROCEDURE — 97110 THERAPEUTIC EXERCISES: CPT | Mod: GP | Performed by: PHYSICAL THERAPIST

## 2025-02-13 ENCOUNTER — THERAPY VISIT (OUTPATIENT)
Dept: PHYSICAL THERAPY | Facility: CLINIC | Age: 60
End: 2025-02-13
Attending: SURGERY
Payer: COMMERCIAL

## 2025-02-13 DIAGNOSIS — S42.202A CLOSED FRACTURE OF LEFT PROXIMAL HUMERUS: Primary | ICD-10-CM

## 2025-02-13 PROCEDURE — 97110 THERAPEUTIC EXERCISES: CPT | Mod: GP

## 2025-02-17 ENCOUNTER — THERAPY VISIT (OUTPATIENT)
Dept: PHYSICAL THERAPY | Facility: CLINIC | Age: 60
End: 2025-02-17
Attending: SURGERY
Payer: COMMERCIAL

## 2025-02-17 DIAGNOSIS — S42.202A CLOSED FRACTURE OF LEFT PROXIMAL HUMERUS: Primary | ICD-10-CM

## 2025-02-17 PROCEDURE — 97110 THERAPEUTIC EXERCISES: CPT | Mod: GP

## 2025-02-19 ENCOUNTER — THERAPY VISIT (OUTPATIENT)
Dept: PHYSICAL THERAPY | Facility: CLINIC | Age: 60
End: 2025-02-19
Attending: SURGERY
Payer: COMMERCIAL

## 2025-02-19 DIAGNOSIS — S42.202A CLOSED FRACTURE OF LEFT PROXIMAL HUMERUS: Primary | ICD-10-CM

## 2025-02-19 PROCEDURE — 97110 THERAPEUTIC EXERCISES: CPT | Mod: GP

## 2025-02-25 ENCOUNTER — THERAPY VISIT (OUTPATIENT)
Dept: PHYSICAL THERAPY | Facility: CLINIC | Age: 60
End: 2025-02-25
Attending: SURGERY
Payer: COMMERCIAL

## 2025-02-25 DIAGNOSIS — S42.202A CLOSED FRACTURE OF LEFT PROXIMAL HUMERUS: Primary | ICD-10-CM

## 2025-02-25 PROCEDURE — 97110 THERAPEUTIC EXERCISES: CPT | Mod: GP | Performed by: PHYSICAL THERAPIST

## 2025-02-25 NOTE — PROGRESS NOTES
02/25/25 0500   Appointment Info   Signing clinician's name / credentials Fernandez Polanco, PT, DPT, OCS   Visits Used 7   Medical Diagnosis Closed fracture of proximal end of left humerus, unspecified fracture morphology, initial encounter (S42.202A)   PT Tx Diagnosis L shoulder pain   Progress Note/Certification   Onset of illness/injury or Date of Surgery 01/08/25   Therapy Frequency 2x/week for 4 weeks; 1x/week for 6 weeks   Predicted Duration 10 weeks   Progress Note Due Date 04/11/25   Progress Note Completed Date 01/31/25   GOALS   PT Goals 2;3;4   PT Goal 1   Goal Identifier STG   Goal Description 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   Target Date 03/07/25   Date Met 02/25/25   PT Goal 2   Goal Identifier STG   Goal Description Patient will be able to reach overhead cabinets with L arm to improve ADLs.   Goal Progress Not yet, progressing AROM   Target Date 03/07/25   PT Goal 3   Goal Identifier LTG   Goal Description Patient will be able to lift 5 lbs with L arm to improve ADLs and progress toward returning to work duties.   Goal Progress Not yet assessed due to lifting restrictions   Target Date 04/11/25   PT Goal 4   Goal Identifier LTG   Goal Description Patient will be independent with HEP for self-management of symptoms.   Target Date 04/11/25   Date Met 02/25/25   Subjective Report   Subjective Report Pt reports she inadvertently yawned and stretched her arm which increased her pain.   Objective Measures   Objective Measures Objective Measure 1;Objective Measure 2   Objective Measure 1   Objective Measure R shoulder PROM   Details IR 55*; ER 35*; Abduction 115*; flexion 130*   Objective Measure 2   Objective Measure R shoulder AROM   Details Flexion:80, abduction: 75, ER: 25 IR: Glute   Treatment Interventions (PT)   Interventions Therapeutic Procedure/Exercise;Manual Therapy   Therapeutic Procedure/Exercise   Therapeutic Procedures: strength, endurance, ROM,  flexibility minutes (18618) 27   Ther Proc 1 - Details Supine PROM L shoulder IR, ER, flex and abd to tolerance x10-15 reps ea. Supine hand clasp AAROM x8 R guiding L overhead for flexion. Standing AAROM L shoulder flexion w/dowel x 10. Standing AAROM shoulder ABD w/dowel x 10 L. standing AAROM shoudler ER w/ dowel x10.  Passive Doorway ER stretch/pec stretch x 10.  Educated on scar massage with use of vitamin E lotion daily for 5 minutes.   Skilled Intervention PROM; HEP; pt education   Patient Response/Progress Some mild shoudler discomfort with s/l flex and IR isometric. Held on iso and decreased ROM with flex.   Education   Learner/Method Patient;No Barriers to Learning;Pictures/Video;Demonstration;Reading;Listening   Plan   Home program PTRX - paper   Updates to plan of care 2x/week   Plan for next session Progress note next prior to MD visit. Try UBE, light strength w/isometriscs, SL ER, rows   Comments   Comments MD follow up 2/24   Total Session Time   Timed Code Treatment Minutes 27   Total Treatment Time (sum of timed and untimed services) 27         PLAN  Progress into AROM and strengthening as directed by physician.      Beginning/End Dates of Progress Note Reporting Period:  01/31/25 to 02/25/2025    Referring Provider:  Miguel Gonzalez

## 2025-02-27 ENCOUNTER — THERAPY VISIT (OUTPATIENT)
Dept: PHYSICAL THERAPY | Facility: CLINIC | Age: 60
End: 2025-02-27
Attending: SURGERY
Payer: COMMERCIAL

## 2025-02-27 DIAGNOSIS — S42.202A CLOSED FRACTURE OF LEFT PROXIMAL HUMERUS: Primary | ICD-10-CM

## 2025-02-27 PROCEDURE — 97110 THERAPEUTIC EXERCISES: CPT | Mod: GP

## 2025-03-04 ENCOUNTER — THERAPY VISIT (OUTPATIENT)
Dept: PHYSICAL THERAPY | Facility: CLINIC | Age: 60
End: 2025-03-04
Attending: SURGERY
Payer: COMMERCIAL

## 2025-03-04 DIAGNOSIS — S42.202A CLOSED FRACTURE OF LEFT PROXIMAL HUMERUS: Primary | ICD-10-CM

## 2025-03-04 PROCEDURE — 97110 THERAPEUTIC EXERCISES: CPT | Mod: GP | Performed by: PHYSICAL THERAPIST

## 2025-03-27 ENCOUNTER — THERAPY VISIT (OUTPATIENT)
Dept: PHYSICAL THERAPY | Facility: CLINIC | Age: 60
End: 2025-03-27
Attending: SURGERY
Payer: COMMERCIAL

## 2025-03-27 DIAGNOSIS — S42.202A CLOSED FRACTURE OF LEFT PROXIMAL HUMERUS: Primary | ICD-10-CM

## 2025-03-27 PROCEDURE — 97110 THERAPEUTIC EXERCISES: CPT | Mod: GP

## 2025-03-27 NOTE — PROGRESS NOTES
03/27/25 0500   Appointment Info   Signing clinician's name / credentials Niecy Raza, PT, DPT   Visits Used 13   Medical Diagnosis Closed fracture of proximal end of left humerus, unspecified fracture morphology, initial encounter (S42.202A)   PT Tx Diagnosis L shoulder pain   Progress Note/Certification   Onset of illness/injury or Date of Surgery 01/08/25   Therapy Frequency 1x/week   Predicted Duration 8 weeks   Progress Note Due Date 04/11/25   Progress Note Completed Date 01/31/25   GOALS   PT Goals 2;3;4   PT Goal 1   Goal Identifier STG   Goal Description 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   Goal Progress MET   Target Date 03/07/25   Date Met 02/25/25   PT Goal 2   Goal Identifier STG   Goal Description Patient will be able to reach overhead cabinets with L arm to improve ADLs.   Goal Progress Progressing - can do certain heights   Target Date 04/24/25   PT Goal 3   Goal Identifier LTG   Goal Description Patient will be able to lift 5 lbs with L arm to improve ADLs and progress toward returning to work duties.   Goal Progress Progressing - can do very light things like charts and water bottles   Target Date 05/08/25   PT Goal 4   Goal Identifier LTG   Goal Description Patient will be independent with HEP for self-management of symptoms.   Goal Progress Progressing - continuously updating   Target Date 05/22/25   Subjective Report   Subjective Report Pt reports the deep pain in the L upper arm went away after icing. When she uses arm at work, it feels better with movement. Is sore after work still when she stops moving.   Objective Measures   Objective Measures Objective Measure 1;Objective Measure 2   Objective Measure 1   Objective Measure L shoulder PROM   Details IR 50*; ER 32*; Abduction 140*; flexion 130*   Objective Measure 2   Objective Measure L shoulder AROM   Details Flexion: 105*, abduction: 107*, ER: back of neck; IR: Glute   Treatment Interventions  (PT)   Interventions Therapeutic Procedure/Exercise;Manual Therapy   Therapeutic Procedure/Exercise   Therapeutic Procedures: strength, endurance, ROM, flexibility minutes (18314) 28   Ther Proc 1 - Details Supine PROM L shoulder IR, ER, flex and abd to tolerance x10-15 reps ea. Flexion wall slides x10 L. Standing bent and straight arm rows GTB x 15 ea B.  Standing shoulder IR GTB x 15 L. Standing IR and ER Standing bicep curls progressing from 4# to 5# x 10 ea L. Standing AROM flex and ABD x 10 ea B with  mirror for visual feedback. Arm bike b/l forward L2 x3 min at end of session.   Skilled Intervention PROM; HEP; pt education   Patient Response/Progress Pt with plateau in PROM/AROM of L shoulder, UT compensation L shoulder with AROM   Education   Learner/Method Patient;No Barriers to Learning;Pictures/Video;Demonstration;Reading;Listening   Plan   Home program PTRX - paper   Plan for next session Holding on visits for now until after MD visit, pt encouraged to schedule after that if desired/needed to have more PT for L shoulder.   Comments   Comments MD follow up 4/9   Total Session Time   Timed Code Treatment Minutes 28   Total Treatment Time (sum of timed and untimed services) 28       PLAN  As above, holding on visits until after MD visit.     Beginning/End Dates of Progress Note Reporting Period:  01/31/25 to 03/27/2025    Referring Provider:  Miguel Gonzalez

## 2025-05-10 ENCOUNTER — HEALTH MAINTENANCE LETTER (OUTPATIENT)
Age: 60
End: 2025-05-10

## 2025-05-12 DIAGNOSIS — E03.9 HYPOTHYROIDISM, UNSPECIFIED TYPE: ICD-10-CM

## 2025-05-13 RX ORDER — LEVOTHYROXINE SODIUM 50 UG/1
50 TABLET ORAL
Qty: 90 TABLET | Refills: 0 | Status: SHIPPED | OUTPATIENT
Start: 2025-05-13

## 2025-05-13 NOTE — TELEPHONE ENCOUNTER
Requested Prescriptions   Pending Prescriptions Disp Refills    levothyroxine (SYNTHROID/LEVOTHROID) 50 MCG tablet [Pharmacy Med Name: LEVOTHYROXINE SODIUM 50MCG TABS] 90 tablet 1     Sig: TAKE ONE TABLET BY MOUTH ONCE DAILY       Thyroid Protocol Failed - 5/13/2025 10:28 AM        Failed - Medication is active on med list and the sig matches. RN to manually verify dose and sig if red X/fail.     If the protocol passes (green check), you do not need to verify med dose and sig.    A prescription matches if they are the same clinical intention.    For Example: once daily and every morning are the same.    The protocol can not identify upper and lower case letters as matching and will fail.     For Example: Take 1 tablet (50 mg) by mouth daily     TAKE 1 TABLET (50 MG) BY MOUTH DAILY    For all fails (red x), verify dose and sig.    If the refill does match what is on file, the RN can still proceed to approve the refill request.       If they do not match, route to the appropriate provider.             Failed - Normal TSH on file in past 12 months     Recent Labs   Lab Test 03/28/24  0835   TSH 8.26*              Passed - Patient is 12 years or older        Passed - Recent (12 mo) or future (90 days) visit within the authorizing provider's specialty     The patient must have completed an in-person or virtual visit within the past 12 months or has a future visit scheduled within the next 90 days with the authorizing provider s specialty.  Urgent care and e-visits do not qualify as an office visit for this protocol.          Passed - Medication indicated for associated diagnosis     Medication is associated with one or more of the following diagnoses:  Hypothyroidism  Thyroid stimulating hormone suppression therapy  Thyroid cancer  Acquired atrophy of thyroid          Passed - No active pregnancy on record     If patient is pregnant or has had a positive pregnancy test, please check TSH.          Passed - No positive  pregnancy test in past 12 months     If patient is pregnant or has had a positive pregnancy test, please check TSH.

## 2025-08-05 ENCOUNTER — OFFICE VISIT (OUTPATIENT)
Dept: FAMILY MEDICINE | Facility: CLINIC | Age: 60
End: 2025-08-05
Payer: COMMERCIAL

## 2025-08-05 VITALS
HEART RATE: 74 BPM | DIASTOLIC BLOOD PRESSURE: 88 MMHG | OXYGEN SATURATION: 94 % | BODY MASS INDEX: 35.37 KG/M2 | TEMPERATURE: 97.1 F | WEIGHT: 233.4 LBS | SYSTOLIC BLOOD PRESSURE: 136 MMHG | RESPIRATION RATE: 16 BRPM | HEIGHT: 68 IN

## 2025-08-05 DIAGNOSIS — Z13.6 SCREENING FOR CARDIOVASCULAR CONDITION: ICD-10-CM

## 2025-08-05 DIAGNOSIS — R73.09 ABNORMAL GLUCOSE: ICD-10-CM

## 2025-08-05 DIAGNOSIS — E78.5 HYPERLIPIDEMIA LDL GOAL <130: ICD-10-CM

## 2025-08-05 DIAGNOSIS — R06.83 HABITUAL SNORING: ICD-10-CM

## 2025-08-05 DIAGNOSIS — E66.01 CLASS 2 SEVERE OBESITY DUE TO EXCESS CALORIES WITH SERIOUS COMORBIDITY AND BODY MASS INDEX (BMI) OF 35.0 TO 35.9 IN ADULT (H): Primary | ICD-10-CM

## 2025-08-05 DIAGNOSIS — E03.9 HYPOTHYROIDISM, UNSPECIFIED TYPE: ICD-10-CM

## 2025-08-05 DIAGNOSIS — R03.0 ELEVATED BLOOD PRESSURE READING WITHOUT DIAGNOSIS OF HYPERTENSION: ICD-10-CM

## 2025-08-05 DIAGNOSIS — R53.83 OTHER FATIGUE: ICD-10-CM

## 2025-08-05 DIAGNOSIS — E66.812 CLASS 2 SEVERE OBESITY DUE TO EXCESS CALORIES WITH SERIOUS COMORBIDITY AND BODY MASS INDEX (BMI) OF 35.0 TO 35.9 IN ADULT (H): Primary | ICD-10-CM

## 2025-08-05 LAB
ANION GAP SERPL CALCULATED.3IONS-SCNC: 16 MMOL/L (ref 7–15)
BUN SERPL-MCNC: 13.6 MG/DL (ref 8–23)
CALCIUM SERPL-MCNC: 9.1 MG/DL (ref 8.8–10.4)
CHLORIDE SERPL-SCNC: 103 MMOL/L (ref 98–107)
CHOLEST SERPL-MCNC: 187 MG/DL
CREAT SERPL-MCNC: 0.58 MG/DL (ref 0.51–0.95)
EGFRCR SERPLBLD CKD-EPI 2021: >90 ML/MIN/1.73M2
ERYTHROCYTE [DISTWIDTH] IN BLOOD BY AUTOMATED COUNT: 13.6 % (ref 10–15)
EST. AVERAGE GLUCOSE BLD GHB EST-MCNC: 126 MG/DL
FASTING STATUS PATIENT QL REPORTED: ABNORMAL
FASTING STATUS PATIENT QL REPORTED: ABNORMAL
GLUCOSE SERPL-MCNC: 105 MG/DL (ref 70–99)
HBA1C MFR BLD: 6 % (ref 0–5.6)
HCO3 SERPL-SCNC: 17 MMOL/L (ref 22–29)
HCT VFR BLD AUTO: 39.2 % (ref 35–47)
HDLC SERPL-MCNC: 59 MG/DL
HGB BLD-MCNC: 13 G/DL (ref 11.7–15.7)
LDLC SERPL CALC-MCNC: 106 MG/DL
MCH RBC QN AUTO: 26.5 PG (ref 26.5–33)
MCHC RBC AUTO-ENTMCNC: 33.2 G/DL (ref 31.5–36.5)
MCV RBC AUTO: 80 FL (ref 78–100)
NONHDLC SERPL-MCNC: 128 MG/DL
PLATELET # BLD AUTO: 293 10E3/UL (ref 150–450)
POTASSIUM SERPL-SCNC: 4.8 MMOL/L (ref 3.4–5.3)
RBC # BLD AUTO: 4.9 10E6/UL (ref 3.8–5.2)
SODIUM SERPL-SCNC: 136 MMOL/L (ref 135–145)
T4 FREE SERPL-MCNC: 0.88 NG/DL (ref 0.9–1.7)
TRIGL SERPL-MCNC: 109 MG/DL
TSH SERPL DL<=0.005 MIU/L-ACNC: 6.17 UIU/ML (ref 0.3–4.2)
VIT B12 SERPL-MCNC: 266 PG/ML (ref 232–1245)
WBC # BLD AUTO: 6.8 10E3/UL (ref 4–11)

## 2025-08-05 PROCEDURE — 80061 LIPID PANEL: CPT | Performed by: NURSE PRACTITIONER

## 2025-08-05 PROCEDURE — 80048 BASIC METABOLIC PNL TOTAL CA: CPT | Performed by: NURSE PRACTITIONER

## 2025-08-05 PROCEDURE — 82607 VITAMIN B-12: CPT | Performed by: NURSE PRACTITIONER

## 2025-08-05 PROCEDURE — 3075F SYST BP GE 130 - 139MM HG: CPT | Performed by: NURSE PRACTITIONER

## 2025-08-05 PROCEDURE — 3079F DIAST BP 80-89 MM HG: CPT | Performed by: NURSE PRACTITIONER

## 2025-08-05 PROCEDURE — 85027 COMPLETE CBC AUTOMATED: CPT | Performed by: NURSE PRACTITIONER

## 2025-08-05 PROCEDURE — 99214 OFFICE O/P EST MOD 30 MIN: CPT | Performed by: NURSE PRACTITIONER

## 2025-08-05 PROCEDURE — G2211 COMPLEX E/M VISIT ADD ON: HCPCS | Performed by: NURSE PRACTITIONER

## 2025-08-05 PROCEDURE — 83036 HEMOGLOBIN GLYCOSYLATED A1C: CPT | Performed by: NURSE PRACTITIONER

## 2025-08-05 PROCEDURE — 84443 ASSAY THYROID STIM HORMONE: CPT | Performed by: NURSE PRACTITIONER

## 2025-08-05 PROCEDURE — 1126F AMNT PAIN NOTED NONE PRSNT: CPT | Performed by: NURSE PRACTITIONER

## 2025-08-05 PROCEDURE — 36415 COLL VENOUS BLD VENIPUNCTURE: CPT | Performed by: NURSE PRACTITIONER

## 2025-08-05 PROCEDURE — 84439 ASSAY OF FREE THYROXINE: CPT | Performed by: NURSE PRACTITIONER

## 2025-08-05 RX ORDER — LEVOTHYROXINE SODIUM 50 UG/1
50 TABLET ORAL
Qty: 90 TABLET | Refills: 3 | Status: SHIPPED | OUTPATIENT
Start: 2025-08-05

## 2025-08-05 ASSESSMENT — PAIN SCALES - GENERAL: PAINLEVEL_OUTOF10: NO PAIN (0)

## 2025-08-06 ENCOUNTER — PATIENT OUTREACH (OUTPATIENT)
Dept: CARE COORDINATION | Facility: CLINIC | Age: 60
End: 2025-08-06
Payer: COMMERCIAL